# Patient Record
Sex: FEMALE | Race: BLACK OR AFRICAN AMERICAN | NOT HISPANIC OR LATINO | Employment: OTHER | URBAN - METROPOLITAN AREA
[De-identification: names, ages, dates, MRNs, and addresses within clinical notes are randomized per-mention and may not be internally consistent; named-entity substitution may affect disease eponyms.]

---

## 2022-06-22 PROBLEM — I10 ESSENTIAL HYPERTENSION: Status: ACTIVE | Noted: 2022-06-22

## 2022-06-22 PROBLEM — E11.9 DIABETES MELLITUS TYPE II, NON INSULIN DEPENDENT: Status: ACTIVE | Noted: 2022-06-22

## 2022-06-22 PROBLEM — N18.4 CHRONIC KIDNEY DISEASE, STAGE IV (SEVERE): Status: ACTIVE | Noted: 2022-06-22

## 2022-06-22 PROBLEM — N25.81 SECONDARY HYPERPARATHYROIDISM OF RENAL ORIGIN: Status: ACTIVE | Noted: 2022-06-22

## 2022-06-22 PROBLEM — R80.1 PERSISTENT PROTEINURIA: Status: ACTIVE | Noted: 2022-06-22

## 2022-06-22 PROBLEM — E78.2 HYPERLIPIDEMIA, MIXED: Status: ACTIVE | Noted: 2022-06-22

## 2022-06-22 PROBLEM — E55.9 VITAMIN D DEFICIENCY: Status: ACTIVE | Noted: 2022-06-22

## 2022-06-22 RX ORDER — FLUOXETINE 10 MG/1
1 CAPSULE ORAL DAILY
COMMUNITY
Start: 2021-11-23

## 2022-06-22 RX ORDER — OMEPRAZOLE 20 MG/1
1 CAPSULE, DELAYED RELEASE ORAL DAILY
COMMUNITY
Start: 2022-05-02 | End: 2023-06-30

## 2022-06-22 RX ORDER — ATORVASTATIN CALCIUM 80 MG/1
80 TABLET, FILM COATED ORAL NIGHTLY
COMMUNITY
Start: 2022-01-05

## 2022-06-22 RX ORDER — ACETAMINOPHEN 500 MG
1000 TABLET ORAL DAILY
COMMUNITY

## 2022-06-22 RX ORDER — ASPIRIN 325 MG
325 TABLET ORAL DAILY
COMMUNITY
Start: 2022-01-19

## 2022-06-22 RX ORDER — GABAPENTIN 100 MG/1
100 CAPSULE ORAL NIGHTLY
COMMUNITY
Start: 2022-04-07

## 2022-06-22 RX ORDER — LISINOPRIL AND HYDROCHLOROTHIAZIDE 12.5; 2 MG/1; MG/1
1 TABLET ORAL DAILY
COMMUNITY
Start: 2022-03-17

## 2022-06-22 RX ORDER — METOPROLOL TARTRATE 25 MG/1
25 TABLET, FILM COATED ORAL 2 TIMES DAILY
COMMUNITY
Start: 2021-08-17 | End: 2023-10-27

## 2022-06-22 RX ORDER — LEVOTHYROXINE SODIUM 100 UG/1
100 TABLET ORAL DAILY
COMMUNITY
Start: 2022-01-27

## 2022-06-22 NOTE — PROGRESS NOTES
Pt Name:  Estefania Bravo  Pt :  1950  Pt MRN:  [id number]    Date: 2022    Reason for visit:   Estefania Bravo is a 72 y.o. aa female presenting for CKD follow up with serum creatinine 2.32, eGFR 23 and Chronic Kidney Disease Stage 4.     Chief Complaint:   The patient denies any complaints today.    HPI:  The patient is being seen today for follow up CKD and the status of her kidney function. Since the last visit, patient reports she has been seen by Ortho and has right knee replacement pending.  The patient denies fatigue, weakness, poor appetite, metallic taste, nausea, cramping, chest pain, palpitations, SOB, orthopnea, PND, trouble concentrating, decreased urination. She c/o lower extremity edema.      Home BPs when checked are <130/80 per patient. The patient is following a low sodium diet. The patient is avoiding NSAIDs. The patient is drinking 32 oz of water daily.     Since last visit on 3/17/22 patient reports above noted changes in medical history.      History:   Past Medical History:   Diagnosis Date    Disorder of kidney and ureter     Diverticulosis     DM (diabetes mellitus)     GERD (gastroesophageal reflux disease)     Graves disease     Hiatal hernia     HTN (hypertension)     Hypothyroidism, postablative     Osteoporosis     Personal history of colonic polyps     Schatzki's ring     Stroke      Past Surgical History:   Procedure Laterality Date    CARDIAC CATHETERIZATION      CHOLECYSTECTOMY      COLONOSCOPY      THYROID ABLATION      TUBAL LIGATION      UPPER GASTROINTESTINAL ENDOSCOPY       Family History   Problem Relation Age of Onset    Heart disease Mother     Heart disease Father     Cancer Sister     Kidney failure Sister      Social History     Substance and Sexual Activity   Alcohol Use Not Currently     Social History     Substance and Sexual Activity   Drug Use Never     Social History     Substance and Sexual Activity   Sexual Activity Not  Currently     reports previously being sexually active.  Social History     Tobacco Use   Smoking Status Former Smoker   Smokeless Tobacco Never Used       Allergies:  Review of patient's allergies indicates:   Allergen Reactions    Denosumab Swelling         Current Outpatient Medications:     aspirin 325 MG tablet, Take 325 mg by mouth once daily., Disp: , Rfl:     atorvastatin (LIPITOR) 80 MG tablet, Take 80 mg by mouth nightly., Disp: , Rfl:     canagliflozin (INVOKANA) 100 mg Tab tablet, Take 100 mg by mouth once daily., Disp: , Rfl:     cholecalciferol, vitamin D3, (VITAMIN D3) 50 mcg (2,000 unit) Cap capsule, Take 1,000 Units by mouth once daily., Disp: , Rfl:     cyclobenzaprine (FLEXERIL) 10 MG tablet, Take 10 mg by mouth 3 (three) times daily as needed., Disp: , Rfl:     FLUoxetine 10 MG capsule, Take 1 capsule by mouth once daily., Disp: , Rfl:     gabapentin (NEURONTIN) 100 MG capsule, Take 100 mg by mouth nightly., Disp: , Rfl:     levothyroxine (SYNTHROID) 112 MCG tablet, Take 112 mcg by mouth once daily at 6am., Disp: , Rfl:     lisinopriL-hydrochlorothiazide (PRINZIDE,ZESTORETIC) 20-12.5 mg per tablet, Take 1 tablet by mouth once daily., Disp: , Rfl:     metoprolol tartrate (LOPRESSOR) 25 MG tablet, Take 25 mg by mouth 2 (two) times daily., Disp: , Rfl:     omeprazole (PRILOSEC) 20 MG capsule, Take 1 capsule by mouth once daily., Disp: , Rfl:     calcitRIOL (ROCALTROL) 0.25 MCG Cap, Take once capsule on Monday - Wednesday - Friday, Disp: 30 capsule, Rfl: 11    ROS:  Review of Systems   Constitutional: Negative for activity change and appetite change.   HENT: Negative for hearing loss.    Eyes: Negative for visual disturbance.   Respiratory: Negative for shortness of breath and wheezing.    Cardiovascular: Positive for leg swelling. Negative for chest pain.   Gastrointestinal: Negative for abdominal pain, diarrhea, nausea and vomiting.   Genitourinary: Negative for decreased urine volume,  dysuria, flank pain, frequency and urgency.   Neurological: Negative for dizziness, weakness and headaches.       Physical Exam:   Vitals:   Vitals:    06/23/22 0942   BP: 128/76   Pulse: 91   Temp: 97.3 °F (36.3 °C)   SpO2: 97%   Weight: 108.2 kg (238 lb 9.6 oz)   Height: 5' (1.524 m)   PainSc: 0-No pain     Body mass index is 46.6 kg/m².    Physical Exam  Vitals reviewed.   Constitutional:       General: She is not in acute distress.     Appearance: Normal appearance.   HENT:      Head: Normocephalic and atraumatic.      Mouth/Throat:      Mouth: Mucous membranes are moist.   Eyes:      Extraocular Movements: Extraocular movements intact.      Pupils: Pupils are equal, round, and reactive to light.   Cardiovascular:      Rate and Rhythm: Normal rate and regular rhythm.      Heart sounds: No murmur heard.     Comments: Trace bilateral pedal edema   Pulmonary:      Effort: Pulmonary effort is normal.      Breath sounds: No wheezing, rhonchi or rales.   Musculoskeletal:         General: No swelling.      Right lower leg: Edema present.      Left lower leg: Edema present.   Skin:     General: Skin is warm and dry.   Neurological:      Mental Status: She is alert and oriented to person, place, and time.   Psychiatric:         Mood and Affect: Mood normal.         Behavior: Behavior normal.           Labs/Tests:  Lab Results   Component Value Date     06/17/2022    K 4.2 06/17/2022     06/17/2022    CO2 27 06/17/2022    BUN 40 (H) 06/17/2022    CREATININE 2.32 (H) 06/17/2022    CREATININE 1.86 (H) 03/14/2022    CREATININE 1.97 (H) 12/15/2021    GLUCOSE 107 (H) 06/17/2022    CALCIUM 9.4 06/17/2022    ALBUMIN 4.0 06/17/2022    EGFRNONAA 20 (L) 06/17/2022    EGFRNONAA 26 (L) 03/14/2022    EGFRNONAA 25 (L) 12/15/2021     (H) 06/17/2022    HGB 13.3 06/17/2022    HGBA1C 6.7 (H) 01/18/2022    TRIG 159 (H) 01/18/2022    CHOL 158 01/18/2022    HDL 25 (L) 01/18/2022    LDLCALC 101 (H) 01/18/2022    LABVLDL 32  (H) 01/18/2022       Component Ref Range & Units 5 d ago   (6/17/22) 3 mo ago   (3/14/22) 6 mo ago   (12/15/21) 9 mo ago   (9/16/21) 11 mo ago   (7/12/21) 1 yr ago   (5/28/21) 1 yr ago   (11/4/20)   Protein, Urine mg/dL mg/dL 54  103  233  240   418     Creatinine, Urine mg/dL mg/dL 50.5  73.1  67.2  56.3  81.7  122.8  51.8    Urine Protein/Creatinine Ratio <0.2 mg of protein/mg of creatinine 1.1 High   1.4 High   3.5 High   4.3 High    3.4 High            Diagnosis:  1. Essential hypertension  Renal Function Panel    Renal Function Panel   2. Diabetes mellitus type II, non insulin dependent  Renal Function Panel    Renal Function Panel   3. Persistent proteinuria  Renal Function Panel    Protein/Creatinine Ratio, Urine    Renal Function Panel    Protein/Creatinine Ratio, Urine   4. Hyperlipidemia, mixed  Renal Function Panel    Renal Function Panel   5. Secondary hyperparathyroidism of renal origin  PTH, Intact    Vitamin D    PTH, Intact    Vitamin D   6. Vitamin D deficiency  Vitamin D    Vitamin D   7. Chronic kidney disease, stage IV (severe)  Hemoglobin    Renal Function Panel    Vitamin D    Hemoglobin    Renal Function Panel    Vitamin D         Plan:  1. Essential hypertension - at goal -  Monitor BP, 2 Gram NA diet; Continue Lisinopril / HCTZ 20/12.5 mg po daily; Metoprolol 25 mg po BID    2. Diabetes mellitus type II, non insulin dependent - Controlled - Last HgbA1c 6.7% 1/18/22 - Continue Invokana    3. Persistent proteinuria - 1100 mg - down from 1400 mg - ACE   4. Hyperlipidemia, mixed - Continue Lipitor 80 mg po nightly    5. Secondary hyperparathyroidism of renal origin -  - Start Rocaltrol 25 mcg po M-W-F   6. Vitamin D deficiency - Continue Vitamin D 3 1000 units    7. Chronic kidney disease, stage IV (severe) - serum creatinine 2.32 / eGFR 23; (last ov 1.86/30)  Avoid NSAIDS, Assure 64 oz of water daily; Meds per # 1, 2, 4, 5, 6 Have reiterated the importance of 64 oz of water daily.           My reconciliation of medication should not condone or support use of medications that have been previously prescribed for patients by other providers.  I am only documenting the current medications patients is taking and may change doses, add or discontinue medications based on information provided by the patient.     The patient has been provided with their current level of kidney function including eGFR and creatinine.    We discussed the potential for common complications of CKD including anemia, electrolyte abnormalities, abnormal fluid balance, mineral bone disease and malnutrition.    We discussed strategies to slow the progression of their kidney disease includin. Avoid nephrotoxic agents. Avoid over-the-counter and prescription NSAIDs (Ibuprofren, Motrin, Naproxyn, Aleve, Mobic, Celebrex, Toradol, Advil). All of these can worsen kidney function, elevate BP, cause fluid retention/swelling and elevate potassium. Avoid iodine contrast agents and gadolinium, which can worsen kidney function and/or cause kidney failure. Avoid phosphosoda bowel preps which can worsen kidney function.  2. Work to improve modifiable risk factors. Aim for good control of blood glucose without episodes of hypoglycemia. Notify the provider managing your diabetes if your blood glucose < 60. Aim for good blood pressure control without episodes of hypotension. Call the office if your systolic blood pressure is consistently < 110. Aim for good control of your cholesterol.  ? AIC goal <7.0  ? BP goal <130/80        Keeping these in goal range will help prevent progression of cardiovascular disease            and chronic kidney disease.    We discussed dietary modifications:  ? Low sodium diet: 2 gm/d or less  ? Limit/avoid high potassium foods  ? Avoid potassium containing salt substitutes  ? Limit/avoid high phosphorus foods  ? Limit daily protein intake to 0.8-1 gm/kg of your ideal body weight.    We discussed lifestyle  modifications:  ? Make sure you are drinking plenty of fluids--64 ounces (1/2 gallon) daily  ? Exercise at least 30 minutes 5 x per week (total 150 minutes per week), example brisk walking  ? Achieve and maintain a healthy weight (BMI 20-25)  ? Limit alcohol consumption to <2 drinks per day  ? Stop smoking  ? Make sure you stay current on important vaccines-- pneumonia vaccines (Pneumovax and Prevnar), flu vaccine, Hepatitis B (especially patients nearing renal replacement therapy and planning hemodialysis) and Covid-19 vaccine.     Recommendations:  1. Monitor your BP at home daily and record.  2. Bring readings to your next appt.  3. Call the office if your BP is persistently >130/80.  4. Seek urgent medical attention with signs and symptoms of uremia - extreme weakness, fatigue, confusion, anorexia, metallic taste in mouth, hiccoughs, cramping, itching, chest pain, swelling, or trouble sleeping.    Follow Up:   Follow up in about 10 weeks (around 9/1/2022).

## 2022-06-23 ENCOUNTER — OFFICE VISIT (OUTPATIENT)
Dept: NEPHROLOGY | Facility: CLINIC | Age: 72
End: 2022-06-23
Payer: MEDICARE

## 2022-06-23 VITALS
SYSTOLIC BLOOD PRESSURE: 128 MMHG | DIASTOLIC BLOOD PRESSURE: 76 MMHG | HEART RATE: 91 BPM | BODY MASS INDEX: 46.85 KG/M2 | TEMPERATURE: 97 F | OXYGEN SATURATION: 97 % | HEIGHT: 60 IN | WEIGHT: 238.63 LBS

## 2022-06-23 DIAGNOSIS — N25.81 SECONDARY HYPERPARATHYROIDISM OF RENAL ORIGIN: ICD-10-CM

## 2022-06-23 DIAGNOSIS — E11.9 DIABETES MELLITUS TYPE II, NON INSULIN DEPENDENT: ICD-10-CM

## 2022-06-23 DIAGNOSIS — N18.4 CHRONIC KIDNEY DISEASE, STAGE IV (SEVERE): ICD-10-CM

## 2022-06-23 DIAGNOSIS — R80.1 PERSISTENT PROTEINURIA: ICD-10-CM

## 2022-06-23 DIAGNOSIS — I10 ESSENTIAL HYPERTENSION: Primary | ICD-10-CM

## 2022-06-23 DIAGNOSIS — E78.2 HYPERLIPIDEMIA, MIXED: ICD-10-CM

## 2022-06-23 DIAGNOSIS — E55.9 VITAMIN D DEFICIENCY: ICD-10-CM

## 2022-06-23 PROCEDURE — 1126F AMNT PAIN NOTED NONE PRSNT: CPT | Mod: CPTII,,, | Performed by: NURSE PRACTITIONER

## 2022-06-23 PROCEDURE — 1126F PR PAIN SEVERITY QUANTIFIED, NO PAIN PRESENT: ICD-10-PCS | Mod: CPTII,,, | Performed by: NURSE PRACTITIONER

## 2022-06-23 PROCEDURE — 3066F PR DOCUMENTATION OF TREATMENT FOR NEPHROPATHY: ICD-10-PCS | Mod: CPTII,,, | Performed by: NURSE PRACTITIONER

## 2022-06-23 PROCEDURE — 4010F ACE/ARB THERAPY RXD/TAKEN: CPT | Mod: CPTII,,, | Performed by: NURSE PRACTITIONER

## 2022-06-23 PROCEDURE — 4010F PR ACE/ARB THEARPY RXD/TAKEN: ICD-10-PCS | Mod: CPTII,,, | Performed by: NURSE PRACTITIONER

## 2022-06-23 PROCEDURE — 1101F PR PT FALLS ASSESS DOC 0-1 FALLS W/OUT INJ PAST YR: ICD-10-PCS | Mod: CPTII,,, | Performed by: NURSE PRACTITIONER

## 2022-06-23 PROCEDURE — 1159F PR MEDICATION LIST DOCUMENTED IN MEDICAL RECORD: ICD-10-PCS | Mod: CPTII,,, | Performed by: NURSE PRACTITIONER

## 2022-06-23 PROCEDURE — 99214 OFFICE O/P EST MOD 30 MIN: CPT | Mod: ,,, | Performed by: NURSE PRACTITIONER

## 2022-06-23 PROCEDURE — 99214 PR OFFICE/OUTPT VISIT, EST, LEVL IV, 30-39 MIN: ICD-10-PCS | Mod: ,,, | Performed by: NURSE PRACTITIONER

## 2022-06-23 PROCEDURE — 3066F NEPHROPATHY DOC TX: CPT | Mod: CPTII,,, | Performed by: NURSE PRACTITIONER

## 2022-06-23 PROCEDURE — 1160F PR REVIEW ALL MEDS BY PRESCRIBER/CLIN PHARMACIST DOCUMENTED: ICD-10-PCS | Mod: CPTII,,, | Performed by: NURSE PRACTITIONER

## 2022-06-23 PROCEDURE — 1159F MED LIST DOCD IN RCRD: CPT | Mod: CPTII,,, | Performed by: NURSE PRACTITIONER

## 2022-06-23 PROCEDURE — 3008F BODY MASS INDEX DOCD: CPT | Mod: CPTII,,, | Performed by: NURSE PRACTITIONER

## 2022-06-23 PROCEDURE — 3078F PR MOST RECENT DIASTOLIC BLOOD PRESSURE < 80 MM HG: ICD-10-PCS | Mod: CPTII,,, | Performed by: NURSE PRACTITIONER

## 2022-06-23 PROCEDURE — 3288F PR FALLS RISK ASSESSMENT DOCUMENTED: ICD-10-PCS | Mod: CPTII,,, | Performed by: NURSE PRACTITIONER

## 2022-06-23 PROCEDURE — 1101F PT FALLS ASSESS-DOCD LE1/YR: CPT | Mod: CPTII,,, | Performed by: NURSE PRACTITIONER

## 2022-06-23 PROCEDURE — 3288F FALL RISK ASSESSMENT DOCD: CPT | Mod: CPTII,,, | Performed by: NURSE PRACTITIONER

## 2022-06-23 PROCEDURE — 3008F PR BODY MASS INDEX (BMI) DOCUMENTED: ICD-10-PCS | Mod: CPTII,,, | Performed by: NURSE PRACTITIONER

## 2022-06-23 PROCEDURE — 1160F RVW MEDS BY RX/DR IN RCRD: CPT | Mod: CPTII,,, | Performed by: NURSE PRACTITIONER

## 2022-06-23 PROCEDURE — 3078F DIAST BP <80 MM HG: CPT | Mod: CPTII,,, | Performed by: NURSE PRACTITIONER

## 2022-06-23 PROCEDURE — 3074F PR MOST RECENT SYSTOLIC BLOOD PRESSURE < 130 MM HG: ICD-10-PCS | Mod: CPTII,,, | Performed by: NURSE PRACTITIONER

## 2022-06-23 PROCEDURE — 3074F SYST BP LT 130 MM HG: CPT | Mod: CPTII,,, | Performed by: NURSE PRACTITIONER

## 2022-06-23 RX ORDER — CALCITRIOL 0.25 UG/1
CAPSULE ORAL
Qty: 30 CAPSULE | Refills: 11 | Status: SHIPPED | OUTPATIENT
Start: 2022-06-23 | End: 2023-05-24 | Stop reason: SDUPTHER

## 2022-06-23 RX ORDER — CYCLOBENZAPRINE HCL 10 MG
10 TABLET ORAL
COMMUNITY
Start: 2022-01-26 | End: 2023-03-24

## 2022-08-31 PROBLEM — R80.9 NON-NEPHROTIC RANGE PROTEINURIA: Status: ACTIVE | Noted: 2022-08-31

## 2022-08-31 NOTE — ASSESSMENT & PLAN NOTE
At goal - 2 Gram NA diet, Monitor BP; Continue Lisinopril / HCTZ 20/12.5 mg po daily; Metoprolol 25 mg po BID

## 2022-08-31 NOTE — ASSESSMENT & PLAN NOTE
Serum creatinine 2.41 / eGFR 22 - without anemia - Avoid NSAIDS, Assure 64 oz of water daily, Meds per Med list above.  Refer to CKD Smart class. Schedule Vein Mapping and Surgical referral for AV access.     NOTE:  Her eGFR dropped to 17 when she had Covid and has come back up to 22.

## 2022-08-31 NOTE — PROGRESS NOTES
Pt Name:  Estefania Bravo  Pt :  1950  Pt MRN:  [id number]    Date: 2022    Reason for visit:   Estefania Bravo is a 72 y.o. aa female presenting for CKD follow up with serum creatinine 2.41, eGFR 22 and Chronic Kidney Disease Stage IV.     Chief Complaint:   The patient denies any complaints today.    HPI:  The patient is being seen today for follow up CKD and the status of her kidney function. Since the last visit, patient reports/medical record review reveals she was seen at [facility] ER on 7/10/22 with c/o abdominal pain.  She was treated with Zofran and GI cocktail and d/c to home with prescription for Carafate.  She had Covid in 2022.  Dr. Lam changed her from Invokana to [facility] on 22. The patient denies fatigue, weakness, poor appetite, metallic taste, nausea, cramping, chest pain, palpitations, orthopnea, PND, edema, trouble concentrating, decreased urination.  She c/o SOB with exertion.     Home BPs when checked are <130/80 per patient. The patient is following a low sodium diet. The patient is avoiding NSAIDs. The patient is drinking 50 - 64 oz of water daily.     Since last visit on 22 patient reports above noted changes in medical history.      History:   Past Medical History:   Diagnosis Date    Disorder of kidney and ureter     Diverticulosis     DM (diabetes mellitus)     GERD (gastroesophageal reflux disease)     Graves disease     Hiatal hernia     HTN (hypertension)     Hypothyroidism, postablative     Osteoporosis     Personal history of colonic polyps     Schatzki's ring     Stroke      Past Surgical History:   Procedure Laterality Date    CARDIAC CATHETERIZATION      CHOLECYSTECTOMY      COLONOSCOPY      THYROID ABLATION      TUBAL LIGATION      UPPER GASTROINTESTINAL ENDOSCOPY       Family History   Problem Relation Age of Onset    Heart disease Mother     Heart disease Father     Cancer Sister     Kidney failure Sister      Social History     Substance and  Sexual Activity   Alcohol Use Not Currently     Social History     Substance and Sexual Activity   Drug Use Never     Social History     Substance and Sexual Activity   Sexual Activity Not Currently     reports that she is not currently sexually active.  Social History     Tobacco Use   Smoking Status Former   Smokeless Tobacco Never       Allergies:  Review of patient's allergies indicates:   Allergen Reactions    Denosumab Swelling         Current Outpatient Medications:     aspirin 325 MG tablet, Take 325 mg by mouth once daily., Disp: , Rfl:     atorvastatin (LIPITOR) 80 MG tablet, Take 80 mg by mouth nightly., Disp: , Rfl:     calcitRIOL (ROCALTROL) 0.25 MCG Cap, Take once capsule on Monday - Wednesday - Friday, Disp: 30 capsule, Rfl: 11    cholecalciferol, vitamin D3, (VITAMIN D3) 50 mcg (2,000 unit) Cap capsule, Take 1,000 Units by mouth once daily., Disp: , Rfl:     cyclobenzaprine (FLEXERIL) 10 MG tablet, Take 10 mg by mouth daily as needed., Disp: , Rfl:     dapagliflozin (FARXIGA) 10 mg tablet, Take 10 mg by mouth once daily., Disp: , Rfl:     FLUoxetine 10 MG capsule, Take 1 capsule by mouth once daily., Disp: , Rfl:     gabapentin (NEURONTIN) 100 MG capsule, Take 100 mg by mouth nightly., Disp: , Rfl:     levothyroxine (SYNTHROID) 112 MCG tablet, Take 112 mcg by mouth once daily at 6am., Disp: , Rfl:     lisinopriL-hydrochlorothiazide (PRINZIDE,ZESTORETIC) 20-12.5 mg per tablet, Take 1 tablet by mouth once daily., Disp: , Rfl:     omeprazole (PRILOSEC) 20 MG capsule, Take 1 capsule by mouth once daily., Disp: , Rfl:     pioglitazone (ACTOS) 30 MG tablet, Take 30 mg by mouth once daily., Disp: , Rfl:     metoprolol tartrate (LOPRESSOR) 25 MG tablet, Take 25 mg by mouth 2 (two) times daily., Disp: , Rfl:     ROS:  Review of Systems   Constitutional:  Negative for activity change and appetite change.   HENT:  Negative for hearing loss.    Eyes:  Negative for visual disturbance.   Respiratory:  Positive for  shortness of breath. Negative for wheezing.    Cardiovascular:  Negative for chest pain.   Gastrointestinal:  Negative for abdominal pain, diarrhea, nausea and vomiting.   Genitourinary:  Negative for decreased urine volume, dysuria, flank pain, frequency and urgency.   Neurological:  Negative for dizziness, weakness and headaches.     Physical Exam:   Vitals:   Vitals:    09/01/22 1022   BP: 133/73   Pulse: 85   SpO2: 96%   Weight: 106.9 kg (235 lb 9.6 oz)   Height: 5' (1.524 m)   PainSc: 0-No pain     Body mass index is 46.01 kg/m².    Physical Exam  Vitals reviewed.   Constitutional:       General: She is not in acute distress.     Appearance: Normal appearance. She is obese.   HENT:      Head: Normocephalic and atraumatic.      Mouth/Throat:      Mouth: Mucous membranes are moist.   Eyes:      Extraocular Movements: Extraocular movements intact.      Pupils: Pupils are equal, round, and reactive to light.   Cardiovascular:      Rate and Rhythm: Normal rate and regular rhythm.      Heart sounds: No murmur heard.     Comments: Trace bilateral pedal edema   Pulmonary:      Effort: Pulmonary effort is normal.      Breath sounds: No wheezing, rhonchi or rales.   Musculoskeletal:         General: No swelling.   Skin:     General: Skin is warm and dry.   Neurological:      Mental Status: She is alert and oriented to person, place, and time.   Psychiatric:         Mood and Affect: Mood normal.         Behavior: Behavior normal.       Labs/Tests:  Lab Results   Component Value Date     08/29/2022    K 4.2 08/29/2022     (H) 08/29/2022    CO2 25 08/29/2022    BUN 42 (H) 08/29/2022    CREATININE 2.41 (H) 08/29/2022    CREATININE 3.00 (H) 07/19/2022    CREATININE 2.31 (H) 07/10/2022    GLUCOSE 87 08/29/2022    CALCIUM 9.8 08/29/2022    PHOSPHORUS 4.0 08/29/2022    ALBUMIN 3.9 08/29/2022    EGFRNONAA 19 (L) 08/29/2022    EGFRNONAA 15 (L) 07/19/2022    EGFRNONAA 20 (L) 07/10/2022    ESTGFRAFRICA 22 (L) 08/29/2022     ESTGFRAFRICA 17 (L) 07/19/2022    ESTGFRAFRICA 23 (L) 07/10/2022     (H) 08/29/2022    HGB 12.8 08/29/2022    HGBA1C 6.9 (H) 07/19/2022    TRIG 181 (H) 07/19/2022    CHOL 134 07/19/2022    HDL 23 (L) 07/19/2022    LDLCALC 74 07/19/2022    LABVLDL 36 (H) 07/19/2022    TZCKXQNZ26RS 55.7 08/29/2022     Component Ref Range & Units 2 d ago   (8/29/22) 2 mo ago   (6/17/22) 5 mo ago   (3/14/22) 8 mo ago   (12/15/21) 11 mo ago   (9/16/21) 1 yr ago   (7/12/21) 1 yr ago   (5/28/21)   Protein, Urine mg/dL mg/dL 65  54  103  233  240   418    Creatinine, Urine mg/dL mg/dL 53.1  50.5  73.1  67.2  56.3  81.7  122.8    Urine Protein/Creatinine Ratio <0.2 mg of protein/mg of creatinine 1.2 High   1.1 High   1.4 High   3.5 High   4.3 High             Diagnosis:  1. Diabetes mellitus type II, non insulin dependent  Renal Function Panel    Renal Function Panel      2. Essential hypertension  Renal Function Panel    Renal Function Panel      3. Chronic kidney disease, stage IV (severe)  Hemoglobin    Renal Function Panel    PTH, Intact    Protein/Creatinine Ratio, Urine    Ambulatory referral/consult to Kidney Smart    Ambulatory referral/consult to Vascular Surgery    US Vein Mapping, Hemodialysis, Bilateral    Hemoglobin    Renal Function Panel    PTH, Intact    Protein/Creatinine Ratio, Urine    US Vein Mapping, Hemodialysis, Bilateral      4. Secondary hyperparathyroidism of renal origin  PTH, Intact    PTH, Intact      5. Non-nephrotic range proteinuria  Protein/Creatinine Ratio, Urine    Protein/Creatinine Ratio, Urine      6. Hyperlipidemia, mixed             Plan:  Diabetes mellitus type II, non insulin dependent  Controlled with HgbA1c 6.9% on 7/19/22 - Continue Actos 30 mg po daily, Farxiga 10 mg po daily     Essential hypertension  At goal - 2 Gram NA diet, Monitor BP; Continue Lisinopril / HCTZ 20/12.5 mg po daily; Metoprolol 25 mg po BID     Chronic kidney disease, stage IV (severe)  Serum creatinine 2.41 / eGFR  22 - without anemia - Avoid NSAIDS, Assure 64 oz of water daily, Meds per Med list above.  Refer to CKD Smart class. Schedule Vein Mapping and Surgical referral for AV access.     NOTE:  Her eGFR dropped to 17 when she had Covid and has come back up to 22.      Secondary hyperparathyroidism of renal origin   - Continue Rocaltrol 25 mcg po M-W-F     Non-nephrotic range proteinuria  1200 mg - up from 1100 mg -  ACE / SGLT2     Hyperlipidemia, mixed  Continue Lipitor 80 mg po nightly        My reconciliation of medication should not condone or support use of medications that have been previously prescribed for patients by other providers.  I am only documenting the current medications patients is taking and may change doses, add or discontinue medications based on information provided by the patient.     The patient has been provided with their current level of kidney function including eGFR and creatinine.    We discussed the potential for common complications of CKD including anemia, electrolyte abnormalities, abnormal fluid balance, mineral bone disease and malnutrition.    We discussed strategies to slow the progression of their kidney disease including:  Avoid nephrotoxic agents. Avoid over-the-counter and prescription NSAIDs (Ibuprofren, Motrin, Naproxyn, Aleve, Mobic, Celebrex, Toradol, Advil). All of these can worsen kidney function, elevate BP, cause fluid retention/swelling and elevate potassium. Avoid iodine contrast agents and gadolinium, which can worsen kidney function and/or cause kidney failure. Avoid phosphosoda bowel preps which can worsen kidney function.  Work to improve modifiable risk factors. Aim for good control of blood glucose without episodes of hypoglycemia. Notify the provider managing your diabetes if your blood glucose < 60. Aim for good blood pressure control without episodes of hypotension. Call the office if your systolic blood pressure is consistently < 110. Aim for good  control of your cholesterol.  AIC goal <7.0  BP goal <130/80        Keeping these in goal range will help prevent progression of cardiovascular disease            and chronic kidney disease.    We discussed dietary modifications:  Low sodium diet: 2 gm/d or less  Limit/avoid high potassium foods  Avoid potassium containing salt substitutes  Limit/avoid high phosphorus foods  Limit daily protein intake to 0.8-1 gm/kg of your ideal body weight.    We discussed lifestyle modifications:  Make sure you are drinking plenty of fluids--64 ounces (1/2 gallon) daily  Exercise at least 30 minutes 5 x per week (total 150 minutes per week), example brisk walking  Achieve and maintain a healthy weight (BMI 20-25)  Limit alcohol consumption to <2 drinks per day  Stop smoking  Make sure you stay current on important vaccines-- pneumonia vaccines (Pneumovax and Prevnar), flu vaccine, Hepatitis B (especially patients nearing renal replacement therapy and planning hemodialysis) and Covid-19 vaccine.     Recommendations:  Monitor your BP at home daily and record.  Bring readings to your next appt.  Call the office if your BP is persistently >130/80.  Seek urgent medical attention with signs and symptoms of uremia - extreme weakness, fatigue, confusion, anorexia, metallic taste in mouth, hiccoughs, cramping, itching, chest pain, swelling, or trouble sleeping.    Follow Up:   Follow up in about 3 months (around 12/1/2022).

## 2022-09-01 ENCOUNTER — TELEPHONE (OUTPATIENT)
Dept: NEPHROLOGY | Facility: CLINIC | Age: 72
End: 2022-09-01

## 2022-09-01 ENCOUNTER — OFFICE VISIT (OUTPATIENT)
Dept: NEPHROLOGY | Facility: CLINIC | Age: 72
End: 2022-09-01
Payer: MEDICARE

## 2022-09-01 VITALS
OXYGEN SATURATION: 96 % | DIASTOLIC BLOOD PRESSURE: 73 MMHG | SYSTOLIC BLOOD PRESSURE: 133 MMHG | HEART RATE: 85 BPM | BODY MASS INDEX: 46.26 KG/M2 | WEIGHT: 235.63 LBS | HEIGHT: 60 IN

## 2022-09-01 DIAGNOSIS — N25.81 SECONDARY HYPERPARATHYROIDISM OF RENAL ORIGIN: ICD-10-CM

## 2022-09-01 DIAGNOSIS — I10 ESSENTIAL HYPERTENSION: ICD-10-CM

## 2022-09-01 DIAGNOSIS — E78.2 HYPERLIPIDEMIA, MIXED: ICD-10-CM

## 2022-09-01 DIAGNOSIS — E11.9 DIABETES MELLITUS TYPE II, NON INSULIN DEPENDENT: Primary | ICD-10-CM

## 2022-09-01 DIAGNOSIS — N18.4 CHRONIC KIDNEY DISEASE, STAGE IV (SEVERE): ICD-10-CM

## 2022-09-01 DIAGNOSIS — R80.9 NON-NEPHROTIC RANGE PROTEINURIA: ICD-10-CM

## 2022-09-01 PROCEDURE — 1160F PR REVIEW ALL MEDS BY PRESCRIBER/CLIN PHARMACIST DOCUMENTED: ICD-10-PCS | Mod: CPTII,,, | Performed by: NURSE PRACTITIONER

## 2022-09-01 PROCEDURE — 3008F PR BODY MASS INDEX (BMI) DOCUMENTED: ICD-10-PCS | Mod: CPTII,,, | Performed by: NURSE PRACTITIONER

## 2022-09-01 PROCEDURE — 1126F PR PAIN SEVERITY QUANTIFIED, NO PAIN PRESENT: ICD-10-PCS | Mod: CPTII,,, | Performed by: NURSE PRACTITIONER

## 2022-09-01 PROCEDURE — 3066F PR DOCUMENTATION OF TREATMENT FOR NEPHROPATHY: ICD-10-PCS | Mod: CPTII,,, | Performed by: NURSE PRACTITIONER

## 2022-09-01 PROCEDURE — 3075F PR MOST RECENT SYSTOLIC BLOOD PRESS GE 130-139MM HG: ICD-10-PCS | Mod: CPTII,,, | Performed by: NURSE PRACTITIONER

## 2022-09-01 PROCEDURE — 1159F PR MEDICATION LIST DOCUMENTED IN MEDICAL RECORD: ICD-10-PCS | Mod: CPTII,,, | Performed by: NURSE PRACTITIONER

## 2022-09-01 PROCEDURE — 3078F PR MOST RECENT DIASTOLIC BLOOD PRESSURE < 80 MM HG: ICD-10-PCS | Mod: CPTII,,, | Performed by: NURSE PRACTITIONER

## 2022-09-01 PROCEDURE — 1126F AMNT PAIN NOTED NONE PRSNT: CPT | Mod: CPTII,,, | Performed by: NURSE PRACTITIONER

## 2022-09-01 PROCEDURE — 99214 OFFICE O/P EST MOD 30 MIN: CPT | Mod: ,,, | Performed by: NURSE PRACTITIONER

## 2022-09-01 PROCEDURE — 3288F FALL RISK ASSESSMENT DOCD: CPT | Mod: CPTII,,, | Performed by: NURSE PRACTITIONER

## 2022-09-01 PROCEDURE — 3008F BODY MASS INDEX DOCD: CPT | Mod: CPTII,,, | Performed by: NURSE PRACTITIONER

## 2022-09-01 PROCEDURE — 3288F PR FALLS RISK ASSESSMENT DOCUMENTED: ICD-10-PCS | Mod: CPTII,,, | Performed by: NURSE PRACTITIONER

## 2022-09-01 PROCEDURE — 4010F ACE/ARB THERAPY RXD/TAKEN: CPT | Mod: CPTII,,, | Performed by: NURSE PRACTITIONER

## 2022-09-01 PROCEDURE — 1160F RVW MEDS BY RX/DR IN RCRD: CPT | Mod: CPTII,,, | Performed by: NURSE PRACTITIONER

## 2022-09-01 PROCEDURE — 1101F PT FALLS ASSESS-DOCD LE1/YR: CPT | Mod: CPTII,,, | Performed by: NURSE PRACTITIONER

## 2022-09-01 PROCEDURE — 99214 PR OFFICE/OUTPT VISIT, EST, LEVL IV, 30-39 MIN: ICD-10-PCS | Mod: ,,, | Performed by: NURSE PRACTITIONER

## 2022-09-01 PROCEDURE — 4010F PR ACE/ARB THEARPY RXD/TAKEN: ICD-10-PCS | Mod: CPTII,,, | Performed by: NURSE PRACTITIONER

## 2022-09-01 PROCEDURE — 3066F NEPHROPATHY DOC TX: CPT | Mod: CPTII,,, | Performed by: NURSE PRACTITIONER

## 2022-09-01 PROCEDURE — 3075F SYST BP GE 130 - 139MM HG: CPT | Mod: CPTII,,, | Performed by: NURSE PRACTITIONER

## 2022-09-01 PROCEDURE — 1101F PR PT FALLS ASSESS DOC 0-1 FALLS W/OUT INJ PAST YR: ICD-10-PCS | Mod: CPTII,,, | Performed by: NURSE PRACTITIONER

## 2022-09-01 PROCEDURE — 3078F DIAST BP <80 MM HG: CPT | Mod: CPTII,,, | Performed by: NURSE PRACTITIONER

## 2022-09-01 PROCEDURE — 1159F MED LIST DOCD IN RCRD: CPT | Mod: CPTII,,, | Performed by: NURSE PRACTITIONER

## 2022-09-01 RX ORDER — DAPAGLIFLOZIN 10 MG/1
10 TABLET, FILM COATED ORAL DAILY
COMMUNITY

## 2022-09-01 RX ORDER — PIOGLITAZONEHYDROCHLORIDE 30 MG/1
30 TABLET ORAL DAILY
COMMUNITY
Start: 2022-07-26 | End: 2023-10-27

## 2022-09-01 NOTE — TELEPHONE ENCOUNTER
Spoke to the pt and informed her that the vein mapping ultrasound is scheduled at the [facility] for Fri. 9/16/22 at 10 am. She was also informed that the surgical referral was sent to Dr. Brothers and they would call her to schedule appt. Pt stated understanding.

## 2022-12-08 PROBLEM — N18.4 HYPERTENSION ASSOCIATED WITH STAGE 4 CHRONIC KIDNEY DISEASE DUE TO TYPE 2 DIABETES MELLITUS: Status: ACTIVE | Noted: 2022-06-22

## 2022-12-08 PROBLEM — N18.4 TYPE 2 DIABETES MELLITUS WITH STAGE 4 CHRONIC KIDNEY DISEASE, WITHOUT LONG-TERM CURRENT USE OF INSULIN: Status: ACTIVE | Noted: 2022-06-22

## 2022-12-08 PROBLEM — I12.9 HYPERTENSION ASSOCIATED WITH STAGE 4 CHRONIC KIDNEY DISEASE DUE TO TYPE 2 DIABETES MELLITUS: Status: ACTIVE | Noted: 2022-06-22

## 2022-12-08 PROBLEM — E11.22 TYPE 2 DIABETES MELLITUS WITH STAGE 4 CHRONIC KIDNEY DISEASE, WITHOUT LONG-TERM CURRENT USE OF INSULIN: Status: ACTIVE | Noted: 2022-06-22

## 2022-12-08 PROBLEM — R80.1 PERSISTENT PROTEINURIA: Status: RESOLVED | Noted: 2022-06-22 | Resolved: 2022-12-08

## 2022-12-08 PROBLEM — E11.22 HYPERTENSION ASSOCIATED WITH STAGE 4 CHRONIC KIDNEY DISEASE DUE TO TYPE 2 DIABETES MELLITUS: Status: ACTIVE | Noted: 2022-06-22

## 2022-12-08 NOTE — ASSESSMENT & PLAN NOTE
Serum creatinine 2.89 / eGFR 17 - without anemia - Avoid NSAIDS, Assure 64 oz of water daily, Meds per Med list above.      She underwent Left upper arm AV graft removal and pericardial patching of the brachial artery due to steal syndrome in the OR on 10/20/22 by Dr. Patrick.    She was referred to CKD Smart Class and attended via telephone.

## 2022-12-08 NOTE — PROGRESS NOTES
Pt Name:  Estefania Bravo  Pt :  1950  Pt MRN:  [id number]    Date: 2022    Reason for visit:   Estefania Bravo is a 72 y.o. aa female presenting for CKD follow up with serum creatinine 2.89, eGFR 17 and Chronic Kidney Disease Stage IV.     Chief Complaint:   The patient denies any complaints today.    HPI:  The patient is being seen today for follow up CKD and the status of her kidney function. Since the last visit, patient reports/medical record review reveals patient had a Left upper arm AV graft removal and pericardial patching of the brachial artery due to steal syndrome in the OR on 10/20/22 by Dr. Patrick. She also reports having dental work done on Monday and she has not been drinking enough water.   The patient denies fatigue, weakness, poor appetite, metallic taste, nausea, cramping, chest pain, palpitations, SOB, orthopnea, PND, edema, trouble concentrating, decreased urination.      Home BPs when checked are <130/80 per patient. The patient is following a low sodium diet. The patient is avoiding NSAIDs. The patient is drinking 64 oz of water daily.     Since last visit on 22 patient reports above noted changes in medical history.      History:   Past Medical History:   Diagnosis Date    Disorder of kidney and ureter     Diverticulosis     DM (diabetes mellitus)     GERD (gastroesophageal reflux disease)     Graves disease     Hiatal hernia     HTN (hypertension)     Hypothyroidism, postablative     Osteoporosis     Persistent proteinuria 2022    Personal history of colonic polyps     Schatzki's ring     Stroke      Past Surgical History:   Procedure Laterality Date    CARDIAC CATHETERIZATION      CHOLECYSTECTOMY      COLONOSCOPY      THYROID ABLATION      TUBAL LIGATION      UPPER GASTROINTESTINAL ENDOSCOPY       Family History   Problem Relation Age of Onset    Heart disease Mother     Heart disease Father     Cancer Sister     Kidney failure Sister      Social History      Substance and Sexual Activity   Alcohol Use Not Currently     Social History     Substance and Sexual Activity   Drug Use Yes    Comment: tylenol #3 for a tooth problem     Social History     Substance and Sexual Activity   Sexual Activity Not Currently     reports that she is not currently sexually active.  Social History     Tobacco Use   Smoking Status Former   Smokeless Tobacco Never       Allergies:  Review of patient's allergies indicates:   Allergen Reactions    Denosumab Swelling         Current Outpatient Medications:     acetaminophen-codeine 300-30mg (TYLENOL #3) 300-30 mg Tab, Take 1 tablet by mouth every 8 (eight) hours as needed., Disp: , Rfl:     aspirin 325 MG tablet, Take 325 mg by mouth once daily., Disp: , Rfl:     atorvastatin (LIPITOR) 80 MG tablet, Take 80 mg by mouth every evening., Disp: , Rfl:     calcitRIOL (ROCALTROL) 0.25 MCG Cap, Take once capsule on Monday - Wednesday - Friday (Patient taking differently: Take 0.25 mcg by mouth every other day. Take once capsule on Monday - Wednesday - Friday), Disp: 30 capsule, Rfl: 11    cholecalciferol, vitamin D3, (VITAMIN D3) 50 mcg (2,000 unit) Cap capsule, Take 1,000 Units by mouth once daily., Disp: , Rfl:     cyclobenzaprine (FLEXERIL) 10 MG tablet, Take 10 mg by mouth as needed., Disp: , Rfl:     dapagliflozin (FARXIGA) 10 mg tablet, Take 10 mg by mouth once daily., Disp: , Rfl:     erythromycin (ROMYCIN) ophthalmic ointment, as needed., Disp: , Rfl:     famotidine (PEPCID) 40 MG tablet, Take 40 mg by mouth Daily., Disp: , Rfl:     FLUoxetine 10 MG capsule, Take 1 capsule by mouth once daily., Disp: , Rfl:     gabapentin (NEURONTIN) 100 MG capsule, Take 100 mg by mouth nightly., Disp: , Rfl:     levothyroxine (SYNTHROID) 112 MCG tablet, Take 112 mcg by mouth once daily at 6am. Every morning, Disp: , Rfl:     lisinopriL-hydrochlorothiazide (PRINZIDE,ZESTORETIC) 20-12.5 mg per tablet, Take 1 tablet by mouth once daily., Disp: , Rfl:      metoprolol tartrate (LOPRESSOR) 25 MG tablet, Take 25 mg by mouth 2 (two) times daily., Disp: , Rfl:     omeprazole (PRILOSEC) 20 MG capsule, Take 1 capsule by mouth once daily., Disp: , Rfl:     penicillin v potassium (VEETID) 500 MG tablet, Take 500 mg by mouth every 12 (twelve) hours., Disp: , Rfl:     pioglitazone (ACTOS) 30 MG tablet, Take 30 mg by mouth once daily. am, Disp: , Rfl:     ROS:  Review of Systems   Constitutional:  Negative for activity change and appetite change.   HENT:  Negative for hearing loss.    Eyes:  Negative for visual disturbance.   Respiratory:  Negative for shortness of breath and wheezing.    Cardiovascular:  Negative for chest pain.   Gastrointestinal:  Negative for abdominal pain, diarrhea, nausea and vomiting.   Genitourinary:  Negative for decreased urine volume, dysuria, flank pain, frequency and urgency.   Neurological:  Negative for dizziness, weakness and headaches.     Physical Exam:   Vitals:   Vitals:    12/09/22 1055   BP: (!) 112/51   Pulse: 93   SpO2: 99%   Weight: 104.3 kg (230 lb)   Height: 5' (1.524 m)   PainSc:   4   PainLoc: Knee     Body mass index is 44.92 kg/m².    Physical Exam  Vitals reviewed.   Constitutional:       General: She is not in acute distress.     Appearance: Normal appearance. She is morbidly obese.   HENT:      Head: Normocephalic and atraumatic.      Mouth/Throat:      Mouth: Mucous membranes are moist.   Eyes:      Extraocular Movements: Extraocular movements intact.      Pupils: Pupils are equal, round, and reactive to light.   Cardiovascular:      Rate and Rhythm: Normal rate and regular rhythm.      Heart sounds: No murmur heard.  Pulmonary:      Effort: Pulmonary effort is normal.      Breath sounds: No wheezing, rhonchi or rales.   Musculoskeletal:         General: No swelling.   Skin:     General: Skin is warm and dry.   Neurological:      Mental Status: She is alert and oriented to person, place, and time.   Psychiatric:         Mood  and Affect: Mood normal.         Behavior: Behavior normal.       Labs/Tests:  RENAL FUNCTION PANEL     Ref Range & Units 1 d ago   Sodium 136 - 147 mmol/L 140    Potassium 3.5 - 5.1 mmol/L 4.2    Chloride 98 - 107 mmol/L 103    CO2 20 - 31 mmol/L 27    Glucose 70 - 99 mg/dL 95    BUN 9 - 23 mg/dL 36 High     Creatinine 0.55 - 1.02 mg/dL 2.89 High     Calcium 8.3 - 10.6 mg/dL 9.4    Phosphorus Level 2.4 - 5.1 mg/dL 4.0    Albumin Level 3.2 - 4.8 g/dL 4.1    Anion Gap 5.0 - 15.0 mmol/L 10.3    eGFR CKD-EPI >90 ml/min/1.73m2 17 Low       Component Ref Range & Units 1 d ago   (12/7/22) 1 mo ago   (10/17/22) 3 mo ago   (8/29/22) 5 mo ago   (7/10/22) 5 mo ago   (6/17/22) 8 mo ago   (3/14/22) 11 mo ago   (12/15/21)   Hemoglobin 11.3 - 15.4 g/dL 13.9  13.1  12.8  14.4  13.3  13.0  13.4      PTH, Intact 15 - 65 pg/mL 222 High           Lab Results   Component Value Date    UPROTCREA 0.7 (H) 12/07/2022    UPROTCREA 1.2 (H) 08/29/2022    UPROTCREA 1.1 (H) 06/17/2022         Diagnosis:  Plan and Assessment:  1. Type 2 diabetes mellitus with stage 4 chronic kidney disease, without long-term current use of insulin  Assessment & Plan:  Controlled with HgbA1c 6.9% on 7/19/22 - Continue Actos 30 mg po daily, Farxiga 10 mg po daily     Orders:  -     Renal Function Panel; Future; Expected date: 03/09/2023  -     Protein/Creatinine Ratio, Urine; Future; Expected date: 03/09/2023    2. Hypertension associated with stage 4 chronic kidney disease due to type 2 diabetes mellitus  Assessment & Plan:  At goal - 2 Gram NA diet, Monitor BP; Continue Lisinopril / HCTZ 20/12.5 mg po daily; Metoprolol 25 mg po BID     Orders:  -     Renal Function Panel; Future; Expected date: 03/09/2023    3. Chronic kidney disease, stage IV (severe)  Assessment & Plan:  Serum creatinine 2.89 / eGFR 17 - without anemia - Avoid NSAIDS, Assure 64 oz of water daily, Meds per Med list above.      She underwent Left upper arm AV graft removal and pericardial  patching of the brachial artery due to steal syndrome in the OR on 10/20/22 by Dr. Patrick.    She was referred to CKD Smart Class and attended via telephone.       Orders:  -     Hemoglobin; Future; Expected date: 03/09/2023  -     Vitamin D; Future; Expected date: 03/09/2023  -     Renal Function Panel; Future; Expected date: 03/09/2023  -     PTH, Intact; Future; Expected date: 03/09/2023  -     Protein/Creatinine Ratio, Urine; Future; Expected date: 03/09/2023    4. Secondary hyperparathyroidism of renal origin  Assessment & Plan:   - Continue Rocaltrol 25 mcg po M-W-F     Orders:  -     Vitamin D; Future; Expected date: 03/09/2023  -     PTH, Intact; Future; Expected date: 03/09/2023    5. Non-nephrotic range proteinuria  Assessment & Plan:  700 mg down from 1200 mg  -  ACE / SGLT2     Orders:  -     Protein/Creatinine Ratio, Urine; Future; Expected date: 03/09/2023    6. Hyperlipidemia, mixed  Assessment & Plan:  Continue Lipitor 80 mg po nightly            My reconciliation of medication should not condone or support use of medications that have been previously prescribed for patients by other providers.  I am only documenting the current medications patients is taking and may change doses, add or discontinue medications based on information provided by the patient.     The patient has been provided with their current level of kidney function including eGFR and creatinine.    We discussed the potential for common complications of CKD including anemia, electrolyte abnormalities, abnormal fluid balance, mineral bone disease and malnutrition.    We discussed strategies to slow the progression of their kidney disease including:  Avoid nephrotoxic agents. Avoid over-the-counter and prescription NSAIDs (Ibuprofren, Motrin, Naproxyn, Aleve, Mobic, Celebrex, Toradol, Advil). All of these can worsen kidney function, elevate BP, cause fluid retention/swelling and elevate potassium. Avoid iodine contrast agents and  gadolinium, which can worsen kidney function and/or cause kidney failure. Avoid phosphosoda bowel preps which can worsen kidney function.  Work to improve modifiable risk factors. Aim for good control of blood glucose without episodes of hypoglycemia. Notify the provider managing your diabetes if your blood glucose < 60. Aim for good blood pressure control without episodes of hypotension. Call the office if your systolic blood pressure is consistently < 110. Aim for good control of your cholesterol.  AIC goal <7.0  BP goal <130/80        Keeping these in goal range will help prevent progression of cardiovascular disease            and chronic kidney disease.    We discussed dietary modifications:  Low sodium diet: 2 gm/d or less  Limit/avoid high potassium foods  Avoid potassium containing salt substitutes  Limit/avoid high phosphorus foods  Limit daily protein intake to 0.8-1 gm/kg of your ideal body weight.    We discussed lifestyle modifications:  Make sure you are drinking plenty of fluids--64 ounces (1/2 gallon) daily  Exercise at least 30 minutes 5 x per week (total 150 minutes per week), example brisk walking  Achieve and maintain a healthy weight (BMI 20-25)  Limit alcohol consumption to <2 drinks per day  Stop smoking  Make sure you stay current on important vaccines-- pneumonia vaccines (Pneumovax and Prevnar), flu vaccine, Hepatitis B (especially patients nearing renal replacement therapy and planning hemodialysis) and Covid-19 vaccine.     Recommendations:  Monitor your BP at home daily and record.  Bring readings to your next appt.  Call the office if your BP is persistently >130/80.  Seek urgent medical attention with signs and symptoms of uremia - extreme weakness, fatigue, confusion, anorexia, metallic taste in mouth, hiccoughs, cramping, itching, chest pain, swelling, or trouble sleeping.    Follow Up:   Follow up in about 3 months (around 3/9/2023).

## 2022-12-09 ENCOUNTER — OFFICE VISIT (OUTPATIENT)
Dept: NEPHROLOGY | Facility: CLINIC | Age: 72
End: 2022-12-09
Payer: MEDICARE

## 2022-12-09 VITALS
DIASTOLIC BLOOD PRESSURE: 51 MMHG | BODY MASS INDEX: 45.16 KG/M2 | HEIGHT: 60 IN | OXYGEN SATURATION: 99 % | HEART RATE: 93 BPM | SYSTOLIC BLOOD PRESSURE: 112 MMHG | WEIGHT: 230 LBS

## 2022-12-09 DIAGNOSIS — E78.2 HYPERLIPIDEMIA, MIXED: ICD-10-CM

## 2022-12-09 DIAGNOSIS — N18.4 TYPE 2 DIABETES MELLITUS WITH STAGE 4 CHRONIC KIDNEY DISEASE, WITHOUT LONG-TERM CURRENT USE OF INSULIN: ICD-10-CM

## 2022-12-09 DIAGNOSIS — I12.9 HYPERTENSION ASSOCIATED WITH STAGE 4 CHRONIC KIDNEY DISEASE DUE TO TYPE 2 DIABETES MELLITUS: ICD-10-CM

## 2022-12-09 DIAGNOSIS — N18.4 CHRONIC KIDNEY DISEASE, STAGE IV (SEVERE): ICD-10-CM

## 2022-12-09 DIAGNOSIS — N25.81 SECONDARY HYPERPARATHYROIDISM OF RENAL ORIGIN: ICD-10-CM

## 2022-12-09 DIAGNOSIS — R80.9 NON-NEPHROTIC RANGE PROTEINURIA: ICD-10-CM

## 2022-12-09 DIAGNOSIS — E11.22 TYPE 2 DIABETES MELLITUS WITH STAGE 4 CHRONIC KIDNEY DISEASE, WITHOUT LONG-TERM CURRENT USE OF INSULIN: ICD-10-CM

## 2022-12-09 DIAGNOSIS — E11.22 HYPERTENSION ASSOCIATED WITH STAGE 4 CHRONIC KIDNEY DISEASE DUE TO TYPE 2 DIABETES MELLITUS: ICD-10-CM

## 2022-12-09 DIAGNOSIS — N18.4 HYPERTENSION ASSOCIATED WITH STAGE 4 CHRONIC KIDNEY DISEASE DUE TO TYPE 2 DIABETES MELLITUS: ICD-10-CM

## 2022-12-09 PROCEDURE — 3288F PR FALLS RISK ASSESSMENT DOCUMENTED: ICD-10-PCS | Mod: CPTII,,, | Performed by: NURSE PRACTITIONER

## 2022-12-09 PROCEDURE — 1125F AMNT PAIN NOTED PAIN PRSNT: CPT | Mod: CPTII,,, | Performed by: NURSE PRACTITIONER

## 2022-12-09 PROCEDURE — 1159F MED LIST DOCD IN RCRD: CPT | Mod: CPTII,,, | Performed by: NURSE PRACTITIONER

## 2022-12-09 PROCEDURE — 3008F PR BODY MASS INDEX (BMI) DOCUMENTED: ICD-10-PCS | Mod: CPTII,,, | Performed by: NURSE PRACTITIONER

## 2022-12-09 PROCEDURE — 3066F PR DOCUMENTATION OF TREATMENT FOR NEPHROPATHY: ICD-10-PCS | Mod: CPTII,,, | Performed by: NURSE PRACTITIONER

## 2022-12-09 PROCEDURE — 1159F PR MEDICATION LIST DOCUMENTED IN MEDICAL RECORD: ICD-10-PCS | Mod: CPTII,,, | Performed by: NURSE PRACTITIONER

## 2022-12-09 PROCEDURE — 3066F NEPHROPATHY DOC TX: CPT | Mod: CPTII,,, | Performed by: NURSE PRACTITIONER

## 2022-12-09 PROCEDURE — 3078F PR MOST RECENT DIASTOLIC BLOOD PRESSURE < 80 MM HG: ICD-10-PCS | Mod: CPTII,,, | Performed by: NURSE PRACTITIONER

## 2022-12-09 PROCEDURE — 99213 OFFICE O/P EST LOW 20 MIN: CPT | Mod: ,,, | Performed by: NURSE PRACTITIONER

## 2022-12-09 PROCEDURE — 1160F PR REVIEW ALL MEDS BY PRESCRIBER/CLIN PHARMACIST DOCUMENTED: ICD-10-PCS | Mod: CPTII,,, | Performed by: NURSE PRACTITIONER

## 2022-12-09 PROCEDURE — 4010F PR ACE/ARB THEARPY RXD/TAKEN: ICD-10-PCS | Mod: CPTII,,, | Performed by: NURSE PRACTITIONER

## 2022-12-09 PROCEDURE — 3074F SYST BP LT 130 MM HG: CPT | Mod: CPTII,,, | Performed by: NURSE PRACTITIONER

## 2022-12-09 PROCEDURE — 1160F RVW MEDS BY RX/DR IN RCRD: CPT | Mod: CPTII,,, | Performed by: NURSE PRACTITIONER

## 2022-12-09 PROCEDURE — 1125F PR PAIN SEVERITY QUANTIFIED, PAIN PRESENT: ICD-10-PCS | Mod: CPTII,,, | Performed by: NURSE PRACTITIONER

## 2022-12-09 PROCEDURE — 3078F DIAST BP <80 MM HG: CPT | Mod: CPTII,,, | Performed by: NURSE PRACTITIONER

## 2022-12-09 PROCEDURE — 3074F PR MOST RECENT SYSTOLIC BLOOD PRESSURE < 130 MM HG: ICD-10-PCS | Mod: CPTII,,, | Performed by: NURSE PRACTITIONER

## 2022-12-09 PROCEDURE — 4010F ACE/ARB THERAPY RXD/TAKEN: CPT | Mod: CPTII,,, | Performed by: NURSE PRACTITIONER

## 2022-12-09 PROCEDURE — 1101F PR PT FALLS ASSESS DOC 0-1 FALLS W/OUT INJ PAST YR: ICD-10-PCS | Mod: CPTII,,, | Performed by: NURSE PRACTITIONER

## 2022-12-09 PROCEDURE — 3008F BODY MASS INDEX DOCD: CPT | Mod: CPTII,,, | Performed by: NURSE PRACTITIONER

## 2022-12-09 PROCEDURE — 99213 PR OFFICE/OUTPT VISIT, EST, LEVL III, 20-29 MIN: ICD-10-PCS | Mod: ,,, | Performed by: NURSE PRACTITIONER

## 2022-12-09 PROCEDURE — 3288F FALL RISK ASSESSMENT DOCD: CPT | Mod: CPTII,,, | Performed by: NURSE PRACTITIONER

## 2022-12-09 PROCEDURE — 1101F PT FALLS ASSESS-DOCD LE1/YR: CPT | Mod: CPTII,,, | Performed by: NURSE PRACTITIONER

## 2022-12-09 RX ORDER — ACETAMINOPHEN AND CODEINE PHOSPHATE 300; 30 MG/1; MG/1
1 TABLET ORAL EVERY 8 HOURS PRN
COMMUNITY
Start: 2022-12-05 | End: 2023-03-24

## 2022-12-09 RX ORDER — PENICILLIN V POTASSIUM 500 MG/1
500 TABLET, FILM COATED ORAL EVERY 12 HOURS
COMMUNITY
Start: 2022-12-05 | End: 2023-03-24

## 2022-12-09 RX ORDER — FAMOTIDINE 40 MG/1
40 TABLET, FILM COATED ORAL DAILY
COMMUNITY
Start: 2022-11-11

## 2022-12-09 RX ORDER — ERYTHROMYCIN 5 MG/G
OINTMENT OPHTHALMIC
COMMUNITY
Start: 2022-10-12 | End: 2023-03-24

## 2023-03-23 PROBLEM — E55.9 VITAMIN D DEFICIENCY: Status: RESOLVED | Noted: 2022-06-22 | Resolved: 2023-03-23

## 2023-03-23 NOTE — ASSESSMENT & PLAN NOTE
At goal - 2 Gram NA diet, Monitor BP; Continue Lisinopril / HCTZ 20/12.5 mg po daily; Metoprolol 25 mg po BID - She does not take her second daily dose of Metoprolol if her SBP is < 110

## 2023-03-23 NOTE — PROGRESS NOTES
Pt Name:  Estefania Bravo  Pt :  1950  Pt MRN:  [id number]    Date: 3/24/2023    Reason for visit:   Estefania Bravo is a 73 y.o. aa female presenting for CKD follow up with serum creatinine 2.97, eGFR 16 and Chronic Kidney Disease Stage IV.     Chief Complaint:   The patient denies any complaints today.    HPI:  The patient is being seen today for follow up CKD and the status of her kidney function. Since the last visit, patient reports no health changes.  The patient denies fatigue, weakness,  metallic taste, nausea, cramping, chest pain, palpitations, orthopnea, PND, edema, trouble concentrating, decreased urination.  She c/o SOB with exertion and poor appetite.     Home BPs when checked are <130/80 per patient. She reports she does not take her second dose of Metoprolol if her BP is < 110 systolic.  The patient is following a low sodium diet. The patient is avoiding NSAIDs. The patient is drinking 64 oz of water daily.     Since last visit on 22 patient reports no changes in medical history.      History:   Past Medical History:   Diagnosis Date    Disorder of kidney and ureter     Diverticulosis     DM (diabetes mellitus)     GERD (gastroesophageal reflux disease)     Graves disease     Hiatal hernia     HTN (hypertension)     Hypothyroidism, postablative     Osteoporosis     Persistent proteinuria 2022    Personal history of colonic polyps     Schatzki's ring     Stroke     Vitamin D deficiency 2022     Past Surgical History:   Procedure Laterality Date    CARDIAC CATHETERIZATION      CHOLECYSTECTOMY      COLONOSCOPY      THYROID ABLATION      TUBAL LIGATION      UPPER GASTROINTESTINAL ENDOSCOPY       Family History   Problem Relation Age of Onset    Heart disease Mother     Heart disease Father     Cancer Sister     Kidney failure Sister      Social History     Substance and Sexual Activity   Alcohol Use Not Currently     Social History     Substance and Sexual Activity   Drug  Use Not Currently     Social History     Substance and Sexual Activity   Sexual Activity Not Currently     reports that she is not currently sexually active.  Social History     Tobacco Use   Smoking Status Former   Smokeless Tobacco Never       Allergies:  Review of patient's allergies indicates:   Allergen Reactions    Denosumab Swelling         Current Outpatient Medications:     aspirin 325 MG tablet, Take 325 mg by mouth once daily., Disp: , Rfl:     atorvastatin (LIPITOR) 80 MG tablet, Take 80 mg by mouth every evening., Disp: , Rfl:     calcitRIOL (ROCALTROL) 0.25 MCG Cap, Take once capsule on Monday - Wednesday - Friday (Patient taking differently: Take 0.25 mcg by mouth every other day. Take once capsule on Monday - Wednesday - Friday), Disp: 30 capsule, Rfl: 11    cholecalciferol, vitamin D3, (VITAMIN D3) 50 mcg (2,000 unit) Cap capsule, Take 1,000 Units by mouth once daily., Disp: , Rfl:     dapagliflozin (FARXIGA) 10 mg tablet, Take 10 mg by mouth once daily., Disp: , Rfl:     famotidine (PEPCID) 40 MG tablet, Take 40 mg by mouth Daily., Disp: , Rfl:     FLUoxetine 10 MG capsule, Take 1 capsule by mouth once daily., Disp: , Rfl:     gabapentin (NEURONTIN) 100 MG capsule, Take 100 mg by mouth nightly., Disp: , Rfl:     latanoprost 0.005 % ophthalmic solution, Place 1 drop into both eyes Daily., Disp: , Rfl:     levothyroxine (SYNTHROID) 100 MCG tablet, Take 100 mcg by mouth once daily at 6am. Every morning, Disp: , Rfl:     lisinopriL-hydrochlorothiazide (PRINZIDE,ZESTORETIC) 20-12.5 mg per tablet, Take 1 tablet by mouth once daily., Disp: , Rfl:     methocarbamoL (ROBAXIN) 500 MG Tab, Take 500 mg by mouth 3 (three) times daily., Disp: , Rfl:     metoprolol tartrate (LOPRESSOR) 25 MG tablet, Take 25 mg by mouth 2 (two) times daily., Disp: , Rfl:     omeprazole (PRILOSEC) 20 MG capsule, Take 1 capsule by mouth once daily., Disp: , Rfl:     pioglitazone (ACTOS) 30 MG tablet, Take 30 mg by mouth once daily.  am, Disp: , Rfl:     ROS:  Review of Systems   Constitutional:  Positive for appetite change. Negative for activity change.   HENT:  Negative for hearing loss.    Eyes:  Negative for visual disturbance.   Respiratory:  Positive for shortness of breath. Negative for wheezing.    Cardiovascular:  Negative for chest pain.   Gastrointestinal:  Negative for abdominal pain, diarrhea, nausea and vomiting.   Genitourinary:  Negative for decreased urine volume, dysuria, flank pain, frequency and urgency.   Neurological:  Negative for dizziness, weakness and headaches.     Physical Exam:   Vitals:   Vitals:    03/24/23 1111 03/24/23 1127   BP: (!) 108/46    Pulse: 78    SpO2: 97%    Weight:  106.6 kg (235 lb)   Height:  5' (1.524 m)   PainSc:  0-No pain     Body mass index is 45.9 kg/m².    Physical Exam  Vitals reviewed.   Constitutional:       General: She is not in acute distress.     Appearance: Normal appearance. She is morbidly obese.   HENT:      Head: Normocephalic and atraumatic.      Mouth/Throat:      Mouth: Mucous membranes are moist.   Eyes:      Extraocular Movements: Extraocular movements intact.      Pupils: Pupils are equal, round, and reactive to light.   Cardiovascular:      Rate and Rhythm: Normal rate and regular rhythm.      Heart sounds: No murmur heard.  Pulmonary:      Effort: Pulmonary effort is normal.      Breath sounds: No wheezing, rhonchi or rales.   Musculoskeletal:         General: No swelling.   Skin:     General: Skin is warm and dry.   Neurological:      Mental Status: She is alert and oriented to person, place, and time.   Psychiatric:         Mood and Affect: Mood normal.         Behavior: Behavior normal.         Labs/Tests:    RENAL FUNCTION PANEL     Ref Range & Units 09:07   Sodium 136 - 147 mmol/L 138    Potassium 3.5 - 5.1 mmol/L 4.2    Chloride 98 - 107 mmol/L 106    CO2 20 - 31 mmol/L 25    Glucose 70 - 99 mg/dL 102 High     BUN 9 - 23 mg/dL 40 High     Creatinine 0.55 - 1.02  mg/dL 2.97 High     Calcium 8.3 - 10.6 mg/dL 9.4    Phosphorus Level 2.4 - 5.1 mg/dL 4.1    Albumin Level 3.2 - 4.8 g/dL 3.7    Anion Gap 5.0 - 15.0 mmol/L 6.7    eGFR CKD-EPI >90 ml/min/1.73m2 16 Low       Lab Results   Component Value Date     (H) 03/23/2023    HGB 13.1 03/23/2023    HGB 13.9 12/07/2022    HGB 13.1 10/17/2022    HGBA1C 6.9 (H) 07/19/2022    TRIG 181 (H) 07/19/2022    CHOL 134 07/19/2022    HDL 23 (L) 07/19/2022    LDLCALC 74 07/19/2022    LABVLDL 36 (H) 07/19/2022    [id number] 40.8 03/23/2023       Lab Results   Component Value Date    UPROTCREA 0.4 (H) 03/23/2023    UPROTCREA 0.7 (H) 12/07/2022    UPROTCREA 1.2 (H) 08/29/2022         Diagnosis:  Plan and Assessment:  1. Type 2 diabetes mellitus with stage 4 chronic kidney disease, without long-term current use of insulin  Assessment & Plan:  Controlled with HgbA1c 6.9% on 7/19/22 - Continue Actos 30 mg po daily, Farxiga 10 mg po daily     Orders:  -     Renal Function Panel; Future; Expected date: 06/24/2023  -     Protein/Creatinine Ratio, Urine; Future; Expected date: 06/24/2023    2. Hypertension associated with stage 4 chronic kidney disease due to type 2 diabetes mellitus  Assessment & Plan:  At goal - 2 Gram NA diet, Monitor BP; Continue Lisinopril / HCTZ 20/12.5 mg po daily; Metoprolol 25 mg po BID - She does not take her second daily dose of Metoprolol if her SBP is < 110     Orders:  -     Renal Function Panel; Future; Expected date: 06/24/2023    3. Chronic kidney disease, stage IV (severe)  Assessment & Plan:  Serum creatinine 2.97 / eGFR 16 (2.89 / eGFR 17 on 12/7/22) - without anemia - Avoid NSAIDS, Assure 64 oz of water daily, Meds per Med list above.      She underwent Left upper arm AV graft removal and pericardial patching of the brachial artery due to steal syndrome in the OR on 10/20/22 by Dr. Patrick.    She was referred to CKD Smart Class and attended via telephone.       Orders:  -     Hemoglobin; Future; Expected  date: 06/24/2023  -     Vitamin D; Future; Expected date: 06/24/2023  -     Renal Function Panel; Future; Expected date: 06/24/2023  -     PTH, Intact; Future; Expected date: 06/24/2023  -     Protein/Creatinine Ratio, Urine; Future; Expected date: 06/24/2023    4. Secondary hyperparathyroidism of renal origin  Assessment & Plan:   - Continue Rocaltrol 25 mcg po QOD     Orders:  -     Vitamin D; Future; Expected date: 06/24/2023  -     PTH, Intact; Future; Expected date: 06/24/2023    5. Non-nephrotic range proteinuria  Assessment & Plan:  400 mg - down from 700 mg  -  ACE / SGLT2     Orders:  -     Protein/Creatinine Ratio, Urine; Future; Expected date: 06/24/2023    6. Hyperlipidemia, mixed  Assessment & Plan:  Continue Lipitor 80 mg po nightly       7. Morbid obesity  Assessment & Plan:  BMI 45.90 - she walks for exercise              My reconciliation of medication should not condone or support use of medications that have been previously prescribed for patients by other providers.  I am only documenting the current medications patients is taking and may change doses, add or discontinue medications based on information provided by the patient.     The patient has been provided with their current level of kidney function including eGFR and creatinine.    We discussed the potential for common complications of CKD including anemia, electrolyte abnormalities, abnormal fluid balance, mineral bone disease and malnutrition.    We discussed strategies to slow the progression of their kidney disease including:  Avoid nephrotoxic agents. Avoid over-the-counter and prescription NSAIDs (Ibuprofren, Motrin, Naproxyn, Aleve, Mobic, Celebrex, Toradol, Advil). All of these can worsen kidney function, elevate BP, cause fluid retention/swelling and elevate potassium. Avoid iodine contrast agents and gadolinium, which can worsen kidney function and/or cause kidney failure. Avoid phosphosoda bowel preps which can worsen  kidney function.  Work to improve modifiable risk factors. Aim for good control of blood glucose without episodes of hypoglycemia. Notify the provider managing your diabetes if your blood glucose < 60. Aim for good blood pressure control without episodes of hypotension. Call the office if your systolic blood pressure is consistently < 110. Aim for good control of your cholesterol.  AIC goal <7.0  BP goal <130/80        Keeping these in goal range will help prevent progression of cardiovascular disease            and chronic kidney disease.    We discussed dietary modifications:  Low sodium diet: 2 gm/d or less  Limit/avoid high potassium foods  Avoid potassium containing salt substitutes  Limit/avoid high phosphorus foods  Limit daily protein intake to 0.8-1 gm/kg of your ideal body weight.    We discussed lifestyle modifications:  Make sure you are drinking plenty of fluids--64 ounces (1/2 gallon) daily  Exercise at least 30 minutes 5 x per week (total 150 minutes per week), example brisk walking  Achieve and maintain a healthy weight (BMI 20-25)  Limit alcohol consumption to <2 drinks per day  Stop smoking  Make sure you stay current on important vaccines-- pneumonia vaccines (Pneumovax and Prevnar), flu vaccine, Hepatitis B (especially patients nearing renal replacement therapy and planning hemodialysis) and Covid-19 vaccine.     Recommendations:  Monitor your BP at home daily and record.  Bring readings to your next appt.  Call the office if your BP is persistently >130/80.  Seek urgent medical attention with signs and symptoms of uremia - extreme weakness, fatigue, confusion, anorexia, metallic taste in mouth, hiccoughs, cramping, itching, chest pain, swelling, or trouble sleeping.    Follow Up:   Follow up in about 3 months (around 6/24/2023).

## 2023-03-23 NOTE — ASSESSMENT & PLAN NOTE
Serum creatinine 2.97 / eGFR 16 (2.89 / eGFR 17 on 12/7/22) - without anemia - Avoid NSAIDS, Assure 64 oz of water daily, Meds per Med list above.      She underwent Left upper arm AV graft removal and pericardial patching of the brachial artery due to steal syndrome in the OR on 10/20/22 by Dr. Patrick.    She was referred to CKD Smart Class and attended via telephone.

## 2023-03-24 ENCOUNTER — OFFICE VISIT (OUTPATIENT)
Dept: NEPHROLOGY | Facility: CLINIC | Age: 73
End: 2023-03-24
Payer: MEDICARE

## 2023-03-24 VITALS
SYSTOLIC BLOOD PRESSURE: 108 MMHG | HEIGHT: 60 IN | OXYGEN SATURATION: 97 % | HEART RATE: 78 BPM | DIASTOLIC BLOOD PRESSURE: 46 MMHG | WEIGHT: 235 LBS | BODY MASS INDEX: 46.13 KG/M2

## 2023-03-24 DIAGNOSIS — E11.22 TYPE 2 DIABETES MELLITUS WITH STAGE 4 CHRONIC KIDNEY DISEASE, WITHOUT LONG-TERM CURRENT USE OF INSULIN: ICD-10-CM

## 2023-03-24 DIAGNOSIS — N18.4 TYPE 2 DIABETES MELLITUS WITH STAGE 4 CHRONIC KIDNEY DISEASE, WITHOUT LONG-TERM CURRENT USE OF INSULIN: ICD-10-CM

## 2023-03-24 DIAGNOSIS — N25.81 SECONDARY HYPERPARATHYROIDISM OF RENAL ORIGIN: ICD-10-CM

## 2023-03-24 DIAGNOSIS — N18.4 HYPERTENSION ASSOCIATED WITH STAGE 4 CHRONIC KIDNEY DISEASE DUE TO TYPE 2 DIABETES MELLITUS: ICD-10-CM

## 2023-03-24 DIAGNOSIS — I12.9 HYPERTENSION ASSOCIATED WITH STAGE 4 CHRONIC KIDNEY DISEASE DUE TO TYPE 2 DIABETES MELLITUS: ICD-10-CM

## 2023-03-24 DIAGNOSIS — R80.9 NON-NEPHROTIC RANGE PROTEINURIA: ICD-10-CM

## 2023-03-24 DIAGNOSIS — E11.22 HYPERTENSION ASSOCIATED WITH STAGE 4 CHRONIC KIDNEY DISEASE DUE TO TYPE 2 DIABETES MELLITUS: ICD-10-CM

## 2023-03-24 DIAGNOSIS — E78.2 HYPERLIPIDEMIA, MIXED: ICD-10-CM

## 2023-03-24 DIAGNOSIS — E66.01 MORBID OBESITY: ICD-10-CM

## 2023-03-24 DIAGNOSIS — N18.4 CHRONIC KIDNEY DISEASE, STAGE IV (SEVERE): ICD-10-CM

## 2023-03-24 PROCEDURE — 3078F PR MOST RECENT DIASTOLIC BLOOD PRESSURE < 80 MM HG: ICD-10-PCS | Mod: CPTII,S$GLB,, | Performed by: NURSE PRACTITIONER

## 2023-03-24 PROCEDURE — 4010F PR ACE/ARB THEARPY RXD/TAKEN: ICD-10-PCS | Mod: CPTII,S$GLB,, | Performed by: NURSE PRACTITIONER

## 2023-03-24 PROCEDURE — 3288F FALL RISK ASSESSMENT DOCD: CPT | Mod: CPTII,S$GLB,, | Performed by: NURSE PRACTITIONER

## 2023-03-24 PROCEDURE — 1159F PR MEDICATION LIST DOCUMENTED IN MEDICAL RECORD: ICD-10-PCS | Mod: CPTII,S$GLB,, | Performed by: NURSE PRACTITIONER

## 2023-03-24 PROCEDURE — 1101F PT FALLS ASSESS-DOCD LE1/YR: CPT | Mod: CPTII,S$GLB,, | Performed by: NURSE PRACTITIONER

## 2023-03-24 PROCEDURE — 3008F BODY MASS INDEX DOCD: CPT | Mod: CPTII,S$GLB,, | Performed by: NURSE PRACTITIONER

## 2023-03-24 PROCEDURE — 3288F PR FALLS RISK ASSESSMENT DOCUMENTED: ICD-10-PCS | Mod: CPTII,S$GLB,, | Performed by: NURSE PRACTITIONER

## 2023-03-24 PROCEDURE — 1126F AMNT PAIN NOTED NONE PRSNT: CPT | Mod: CPTII,S$GLB,, | Performed by: NURSE PRACTITIONER

## 2023-03-24 PROCEDURE — 1101F PR PT FALLS ASSESS DOC 0-1 FALLS W/OUT INJ PAST YR: ICD-10-PCS | Mod: CPTII,S$GLB,, | Performed by: NURSE PRACTITIONER

## 2023-03-24 PROCEDURE — 1160F RVW MEDS BY RX/DR IN RCRD: CPT | Mod: CPTII,S$GLB,, | Performed by: NURSE PRACTITIONER

## 2023-03-24 PROCEDURE — 99213 OFFICE O/P EST LOW 20 MIN: CPT | Mod: S$GLB,,, | Performed by: NURSE PRACTITIONER

## 2023-03-24 PROCEDURE — 1160F PR REVIEW ALL MEDS BY PRESCRIBER/CLIN PHARMACIST DOCUMENTED: ICD-10-PCS | Mod: CPTII,S$GLB,, | Performed by: NURSE PRACTITIONER

## 2023-03-24 PROCEDURE — 1159F MED LIST DOCD IN RCRD: CPT | Mod: CPTII,S$GLB,, | Performed by: NURSE PRACTITIONER

## 2023-03-24 PROCEDURE — 3074F PR MOST RECENT SYSTOLIC BLOOD PRESSURE < 130 MM HG: ICD-10-PCS | Mod: CPTII,S$GLB,, | Performed by: NURSE PRACTITIONER

## 2023-03-24 PROCEDURE — 99213 PR OFFICE/OUTPT VISIT, EST, LEVL III, 20-29 MIN: ICD-10-PCS | Mod: S$GLB,,, | Performed by: NURSE PRACTITIONER

## 2023-03-24 PROCEDURE — 3078F DIAST BP <80 MM HG: CPT | Mod: CPTII,S$GLB,, | Performed by: NURSE PRACTITIONER

## 2023-03-24 PROCEDURE — 1126F PR PAIN SEVERITY QUANTIFIED, NO PAIN PRESENT: ICD-10-PCS | Mod: CPTII,S$GLB,, | Performed by: NURSE PRACTITIONER

## 2023-03-24 PROCEDURE — 3074F SYST BP LT 130 MM HG: CPT | Mod: CPTII,S$GLB,, | Performed by: NURSE PRACTITIONER

## 2023-03-24 PROCEDURE — 4010F ACE/ARB THERAPY RXD/TAKEN: CPT | Mod: CPTII,S$GLB,, | Performed by: NURSE PRACTITIONER

## 2023-03-24 PROCEDURE — 3066F NEPHROPATHY DOC TX: CPT | Mod: CPTII,S$GLB,, | Performed by: NURSE PRACTITIONER

## 2023-03-24 PROCEDURE — 3008F PR BODY MASS INDEX (BMI) DOCUMENTED: ICD-10-PCS | Mod: CPTII,S$GLB,, | Performed by: NURSE PRACTITIONER

## 2023-03-24 PROCEDURE — 3066F PR DOCUMENTATION OF TREATMENT FOR NEPHROPATHY: ICD-10-PCS | Mod: CPTII,S$GLB,, | Performed by: NURSE PRACTITIONER

## 2023-03-24 RX ORDER — METHOCARBAMOL 500 MG/1
500 TABLET, FILM COATED ORAL
COMMUNITY

## 2023-03-24 RX ORDER — LATANOPROST 50 UG/ML
1 SOLUTION/ DROPS OPHTHALMIC
COMMUNITY
Start: 2022-12-24

## 2023-05-24 RX ORDER — CALCITRIOL 0.25 UG/1
0.25 CAPSULE ORAL
Qty: 30 CAPSULE | Refills: 11 | Status: SHIPPED | OUTPATIENT
Start: 2023-05-24 | End: 2023-06-30

## 2023-06-29 NOTE — PROGRESS NOTES
Pt Name:  Estefania Bravo  Pt :  1950  Pt MRN:  [id number]    Date: 2023    Reason for visit:   Estefania Bravo is a 73 y.o. aa female presenting for CKD follow up with serum creatinine 2.94, eGFR 16 and Chronic Kidney Disease Stage IV.     Chief Complaint:   The patient denies any complaints today.    HPI:  The patient is being seen today for follow up CKD and the status of her kidney function. Since the last visit, patient reports she was started on Tymlos 80 mcg SQ injections daily for her Osteoporosis approximately 2 weeks ago.  The patient denies fatigue, weakness, poor appetite, metallic taste, nausea, cramping, chest pain, palpitations, SOB, orthopnea, PND, edema, trouble concentrating, decreased urination.      Home BPs when checked are <130/80 per patient. The patient is following a low sodium diet. The patient is avoiding NSAIDs. The patient is drinking 64 oz of water daily.     Since last visit on 3/24/23 patient reports above noted changes in medical history.      History:   Past Medical History:   Diagnosis Date    Disorder of kidney and ureter     Diverticulosis     DM (diabetes mellitus)     GERD (gastroesophageal reflux disease)     Graves disease     Hiatal hernia     HTN (hypertension)     Hypothyroidism, postablative     Osteoporosis     Persistent proteinuria 2022    Personal history of colonic polyps     Schatzki's ring     Stroke     Vitamin D deficiency 2022     Past Surgical History:   Procedure Laterality Date    CARDIAC CATHETERIZATION      CHOLECYSTECTOMY      COLONOSCOPY      THYROID ABLATION      TUBAL LIGATION      UPPER GASTROINTESTINAL ENDOSCOPY       Family History   Problem Relation Age of Onset    Heart disease Mother     Heart disease Father     Cancer Sister     Kidney failure Sister      Social History     Substance and Sexual Activity   Alcohol Use Not Currently     Social History     Substance and Sexual Activity   Drug Use Not Currently      Social History     Substance and Sexual Activity   Sexual Activity Not Currently     reports that she is not currently sexually active.  Social History     Tobacco Use   Smoking Status Former   Smokeless Tobacco Never       Allergies:  Review of patient's allergies indicates:   Allergen Reactions    Denosumab Swelling         Current Outpatient Medications:     abaloparatide (TYMLOS) 80 mcg (3,120 mcg/1.56 mL) PnIj, Inject 80 mcg into the skin once daily., Disp: , Rfl:     aspirin 325 MG tablet, Take 325 mg by mouth once daily., Disp: , Rfl:     atorvastatin (LIPITOR) 80 MG tablet, Take 80 mg by mouth every evening., Disp: , Rfl:     b complex vitamins tablet, Take 1 tablet by mouth once daily., Disp: , Rfl:     cholecalciferol, vitamin D3, (VITAMIN D3) 50 mcg (2,000 unit) Cap capsule, Take 1,000 Units by mouth once daily., Disp: , Rfl:     dapagliflozin (FARXIGA) 10 mg tablet, Take 10 mg by mouth once daily., Disp: , Rfl:     famotidine (PEPCID) 40 MG tablet, Take 40 mg by mouth Daily., Disp: , Rfl:     FLUoxetine 10 MG capsule, Take 1 capsule by mouth once daily., Disp: , Rfl:     gabapentin (NEURONTIN) 100 MG capsule, Take 100 mg by mouth nightly., Disp: , Rfl:     latanoprost 0.005 % ophthalmic solution, Place 1 drop into both eyes Daily., Disp: , Rfl:     levothyroxine (SYNTHROID) 100 MCG tablet, Take 100 mcg by mouth once daily at 6am. Every morning, Disp: , Rfl:     lisinopriL-hydrochlorothiazide (PRINZIDE,ZESTORETIC) 20-12.5 mg per tablet, Take 1 tablet by mouth once daily., Disp: , Rfl:     methocarbamoL (ROBAXIN) 500 MG Tab, Take 500 mg by mouth as needed., Disp: , Rfl:     metoprolol tartrate (LOPRESSOR) 25 MG tablet, Take 25 mg by mouth 2 (two) times daily., Disp: , Rfl:     pioglitazone (ACTOS) 30 MG tablet, Take 30 mg by mouth once daily. am, Disp: , Rfl:     calcitRIOL (ROCALTROL) 0.25 MCG Cap, Take 1 capsule (0.25 mcg total) by mouth once daily., Disp: 30 capsule, Rfl: 11    ROS:  Review of  Systems   Constitutional:  Negative for activity change and appetite change.   HENT:  Negative for hearing loss.    Eyes:  Negative for visual disturbance.   Respiratory:  Negative for shortness of breath and wheezing.    Cardiovascular:  Negative for chest pain.   Gastrointestinal:  Negative for abdominal pain, diarrhea, nausea and vomiting.   Genitourinary:  Negative for decreased urine volume, dysuria, flank pain, frequency and urgency.   Neurological:  Negative for dizziness, weakness and headaches.     Physical Exam:   Vitals:   Vitals:    06/30/23 1006 06/30/23 1008   BP: (!) 105/46 (!) 100/41   Pulse: 85    SpO2: 98%    Weight: 104.8 kg (231 lb)    Height: 5' (1.524 m)      Body mass index is 45.11 kg/m².    Physical Exam  Vitals reviewed.   Constitutional:       General: She is not in acute distress.     Appearance: Normal appearance. She is morbidly obese.   HENT:      Head: Normocephalic and atraumatic.      Mouth/Throat:      Mouth: Mucous membranes are moist.   Eyes:      Extraocular Movements: Extraocular movements intact.      Pupils: Pupils are equal, round, and reactive to light.   Cardiovascular:      Rate and Rhythm: Normal rate and regular rhythm.      Heart sounds: No murmur heard.  Pulmonary:      Effort: Pulmonary effort is normal.      Breath sounds: No wheezing, rhonchi or rales.   Musculoskeletal:         General: No swelling.   Skin:     General: Skin is warm and dry.   Neurological:      Mental Status: She is alert and oriented to person, place, and time.   Psychiatric:         Mood and Affect: Mood normal.         Behavior: Behavior normal.         Labs/Tests:    Contains abnormal data RENAL FUNCTION PANEL  6/28/23      Ref Range & Units 1 d ago   Sodium 136 - 147 mmol/L 142    Potassium 3.5 - 5.1 mmol/L 4.4    Chloride 98 - 107 mmol/L 109 High     CO2 20 - 31 mmol/L 24    Glucose 70 - 99 mg/dL 94    BUN 9 - 23 mg/dL 40 High     Creatinine 0.55 - 1.02 mg/dL 2.94 High     Calcium 8.3 -  10.6 mg/dL 8.9    Phosphorus Level 2.4 - 5.1 mg/dL 3.8    Albumin Level 3.2 - 4.8 g/dL 3.9    Anion Gap 5.0 - 15.0 mmol/L 8.5    eGFR CKD-EPI >90 ml/min/1.73m2 16 Low       Lab Results   Component Value Date     (H) 06/28/2023    HGB 12.8 06/28/2023    HGB 13.1 03/23/2023    HGB 13.9 12/07/2022    HGBA1C 5.6 05/03/2023    TRIG 181 (H) 07/19/2022    CHOL 134 07/19/2022    HDL 23 (L) 07/19/2022    LDLCALC 74 07/19/2022    LABVLDL 36 (H) 07/19/2022    [id number] 53.6 06/28/2023       Lab Results   Component Value Date    UPROTCREA 0.7 (H) 06/28/2023    UPROTCREA 0.4 (H) 03/23/2023    UPROTCREA 0.7 (H) 12/07/2022         Diagnosis:  Plan and Assessment:  1. Type 2 diabetes mellitus with stage 4 chronic kidney disease, without long-term current use of insulin  Assessment & Plan:  Controlled with HgbA1c 5.6% on 5/3/23 - Continue Actos 30 mg po daily, Farxiga 10 mg po daily     Orders:  -     Renal Function Panel; Future; Expected date: 09/30/2023  -     Protein/Creatinine Ratio, Urine; Future; Expected date: 09/30/2023    2. Hypertension associated with stage 4 chronic kidney disease due to type 2 diabetes mellitus  Assessment & Plan:  At goal - 2 Gram NA diet, Monitor BP; Continue Lisinopril / HCTZ 20/12.5 mg po daily; Metoprolol 25 mg po BID - She does not take her second daily dose of Metoprolol if her SBP is < 110     Orders:  -     Renal Function Panel; Future; Expected date: 09/30/2023    3. Chronic kidney disease, stage IV (severe)  Assessment & Plan:  Serum creatinine 2.94 / eGFR 16; (2.97 / eGFR 16 on 3/23/23 and 2.89 / eGFR 17 on 12/7/22) - without anemia - Avoid NSAIDS, Assure 64 oz of water daily, Meds per Med list above.      She underwent Left upper arm AV graft removal and pericardial patching of the brachial artery due to steal syndrome in the OR on 10/20/22 by Dr. Patrick.  Non-functioning.     She was referred to CKD Smart Class and attended via telephone.       Orders:  -     Hemoglobin; Future;  Expected date: 09/30/2023  -     Vitamin D; Future; Expected date: 09/30/2023  -     Renal Function Panel; Future; Expected date: 09/30/2023  -     PTH, Intact; Future; Expected date: 09/30/2023  -     Protein/Creatinine Ratio, Urine; Future; Expected date: 09/30/2023    4. Secondary hyperparathyroidism of renal origin  Assessment & Plan:   - up from 167 - Increase Rocaltrol 25 mcg po to daily     Orders:  -     Vitamin D; Future; Expected date: 09/30/2023  -     PTH, Intact; Future; Expected date: 09/30/2023    5. Non-nephrotic range proteinuria  Assessment & Plan:  700 mg - up from 400 mg -   ACE / SGLT2     Orders:  -     Protein/Creatinine Ratio, Urine; Future; Expected date: 09/30/2023    6. Hyperlipidemia, mixed  Assessment & Plan:  Continue Lipitor 80 mg po nightly       7. Morbid obesity  Assessment & Plan:  BMI 45.11 - she walks for exercise - She is down 4 lbs since her last OV in 3/23.       Other orders  -     calcitRIOL (ROCALTROL) 0.25 MCG Cap; Take 1 capsule (0.25 mcg total) by mouth once daily.  Dispense: 30 capsule; Refill: 11           My reconciliation of medication should not condone or support use of medications that have been previously prescribed for patients by other providers.  I am only documenting the current medications patients is taking and may change doses, add or discontinue medications based on information provided by the patient.     The patient has been provided with their current level of kidney function including eGFR and creatinine.    We discussed the potential for common complications of CKD including anemia, electrolyte abnormalities, abnormal fluid balance, mineral bone disease and malnutrition.    We discussed strategies to slow the progression of their kidney disease including:  Avoid nephrotoxic agents. Avoid over-the-counter and prescription NSAIDs (Ibuprofren, Motrin, Naproxyn, Aleve, Mobic, Celebrex, Toradol, Advil). All of these can worsen kidney function,  elevate BP, cause fluid retention/swelling and elevate potassium. Avoid iodine contrast agents and gadolinium, which can worsen kidney function and/or cause kidney failure. Avoid phosphosoda bowel preps which can worsen kidney function.  Work to improve modifiable risk factors. Aim for good control of blood glucose without episodes of hypoglycemia. Notify the provider managing your diabetes if your blood glucose < 60. Aim for good blood pressure control without episodes of hypotension. Call the office if your systolic blood pressure is consistently < 110. Aim for good control of your cholesterol.  AIC goal <7.0  BP goal <130/80        Keeping these in goal range will help prevent progression of cardiovascular disease            and chronic kidney disease.    We discussed dietary modifications:  Low sodium diet: 2 gm/d or less  Limit/avoid high potassium foods  Avoid potassium containing salt substitutes  Limit/avoid high phosphorus foods  Limit daily protein intake to 0.8-1 gm/kg of your ideal body weight.    We discussed lifestyle modifications:  Make sure you are drinking plenty of fluids--64 ounces (1/2 gallon) daily  Exercise at least 30 minutes 5 x per week (total 150 minutes per week), example brisk walking  Achieve and maintain a healthy weight (BMI 20-25)  Limit alcohol consumption to <2 drinks per day  Stop smoking  Make sure you stay current on important vaccines-- pneumonia vaccines (Pneumovax and Prevnar), flu vaccine, Hepatitis B (especially patients nearing renal replacement therapy and planning hemodialysis) and Covid-19 vaccine.     Recommendations:  Monitor your BP at home daily and record.  Bring readings to your next appt.  Call the office if your BP is persistently >130/80.  Seek urgent medical attention with signs and symptoms of uremia - extreme weakness, fatigue, confusion, anorexia, metallic taste in mouth, hiccoughs, cramping, itching, chest pain, swelling, or trouble sleeping.    Follow Up:    Follow up in about 3 months (around 9/30/2023).

## 2023-06-29 NOTE — ASSESSMENT & PLAN NOTE
Serum creatinine 2.94 / eGFR 16; (2.97 / eGFR 16 on 3/23/23 and 2.89 / eGFR 17 on 12/7/22) - without anemia - Avoid NSAIDS, Assure 64 oz of water daily, Meds per Med list above.      She underwent Left upper arm AV graft removal and pericardial patching of the brachial artery due to steal syndrome in the OR on 10/20/22 by Dr. Patrick.  Non-functioning.     She was referred to CKD Smart Class and attended via telephone.

## 2023-06-30 ENCOUNTER — OFFICE VISIT (OUTPATIENT)
Dept: NEPHROLOGY | Facility: CLINIC | Age: 73
End: 2023-06-30
Payer: MEDICARE

## 2023-06-30 VITALS
OXYGEN SATURATION: 98 % | DIASTOLIC BLOOD PRESSURE: 41 MMHG | SYSTOLIC BLOOD PRESSURE: 100 MMHG | WEIGHT: 231 LBS | HEIGHT: 60 IN | HEART RATE: 85 BPM | BODY MASS INDEX: 45.35 KG/M2

## 2023-06-30 DIAGNOSIS — N18.4 CHRONIC KIDNEY DISEASE, STAGE IV (SEVERE): ICD-10-CM

## 2023-06-30 DIAGNOSIS — E78.2 HYPERLIPIDEMIA, MIXED: ICD-10-CM

## 2023-06-30 DIAGNOSIS — I12.9 HYPERTENSION ASSOCIATED WITH STAGE 4 CHRONIC KIDNEY DISEASE DUE TO TYPE 2 DIABETES MELLITUS: ICD-10-CM

## 2023-06-30 DIAGNOSIS — N18.4 HYPERTENSION ASSOCIATED WITH STAGE 4 CHRONIC KIDNEY DISEASE DUE TO TYPE 2 DIABETES MELLITUS: ICD-10-CM

## 2023-06-30 DIAGNOSIS — E11.22 HYPERTENSION ASSOCIATED WITH STAGE 4 CHRONIC KIDNEY DISEASE DUE TO TYPE 2 DIABETES MELLITUS: ICD-10-CM

## 2023-06-30 DIAGNOSIS — N18.4 TYPE 2 DIABETES MELLITUS WITH STAGE 4 CHRONIC KIDNEY DISEASE, WITHOUT LONG-TERM CURRENT USE OF INSULIN: ICD-10-CM

## 2023-06-30 DIAGNOSIS — R80.9 NON-NEPHROTIC RANGE PROTEINURIA: ICD-10-CM

## 2023-06-30 DIAGNOSIS — N25.81 SECONDARY HYPERPARATHYROIDISM OF RENAL ORIGIN: ICD-10-CM

## 2023-06-30 DIAGNOSIS — E11.22 TYPE 2 DIABETES MELLITUS WITH STAGE 4 CHRONIC KIDNEY DISEASE, WITHOUT LONG-TERM CURRENT USE OF INSULIN: ICD-10-CM

## 2023-06-30 DIAGNOSIS — E66.01 MORBID OBESITY: ICD-10-CM

## 2023-06-30 PROCEDURE — 1160F PR REVIEW ALL MEDS BY PRESCRIBER/CLIN PHARMACIST DOCUMENTED: ICD-10-PCS | Mod: CPTII,S$GLB,, | Performed by: NURSE PRACTITIONER

## 2023-06-30 PROCEDURE — 3008F BODY MASS INDEX DOCD: CPT | Mod: CPTII,S$GLB,, | Performed by: NURSE PRACTITIONER

## 2023-06-30 PROCEDURE — 99214 OFFICE O/P EST MOD 30 MIN: CPT | Mod: S$GLB,,, | Performed by: NURSE PRACTITIONER

## 2023-06-30 PROCEDURE — 3074F SYST BP LT 130 MM HG: CPT | Mod: CPTII,S$GLB,, | Performed by: NURSE PRACTITIONER

## 2023-06-30 PROCEDURE — 1159F PR MEDICATION LIST DOCUMENTED IN MEDICAL RECORD: ICD-10-PCS | Mod: CPTII,S$GLB,, | Performed by: NURSE PRACTITIONER

## 2023-06-30 PROCEDURE — 3008F PR BODY MASS INDEX (BMI) DOCUMENTED: ICD-10-PCS | Mod: CPTII,S$GLB,, | Performed by: NURSE PRACTITIONER

## 2023-06-30 PROCEDURE — 3066F NEPHROPATHY DOC TX: CPT | Mod: CPTII,S$GLB,, | Performed by: NURSE PRACTITIONER

## 2023-06-30 PROCEDURE — 3074F PR MOST RECENT SYSTOLIC BLOOD PRESSURE < 130 MM HG: ICD-10-PCS | Mod: CPTII,S$GLB,, | Performed by: NURSE PRACTITIONER

## 2023-06-30 PROCEDURE — 99214 PR OFFICE/OUTPT VISIT, EST, LEVL IV, 30-39 MIN: ICD-10-PCS | Mod: S$GLB,,, | Performed by: NURSE PRACTITIONER

## 2023-06-30 PROCEDURE — 1101F PT FALLS ASSESS-DOCD LE1/YR: CPT | Mod: CPTII,S$GLB,, | Performed by: NURSE PRACTITIONER

## 2023-06-30 PROCEDURE — 4010F PR ACE/ARB THEARPY RXD/TAKEN: ICD-10-PCS | Mod: CPTII,S$GLB,, | Performed by: NURSE PRACTITIONER

## 2023-06-30 PROCEDURE — 3066F PR DOCUMENTATION OF TREATMENT FOR NEPHROPATHY: ICD-10-PCS | Mod: CPTII,S$GLB,, | Performed by: NURSE PRACTITIONER

## 2023-06-30 PROCEDURE — 1160F RVW MEDS BY RX/DR IN RCRD: CPT | Mod: CPTII,S$GLB,, | Performed by: NURSE PRACTITIONER

## 2023-06-30 PROCEDURE — 1159F MED LIST DOCD IN RCRD: CPT | Mod: CPTII,S$GLB,, | Performed by: NURSE PRACTITIONER

## 2023-06-30 PROCEDURE — 3078F DIAST BP <80 MM HG: CPT | Mod: CPTII,S$GLB,, | Performed by: NURSE PRACTITIONER

## 2023-06-30 PROCEDURE — 3288F PR FALLS RISK ASSESSMENT DOCUMENTED: ICD-10-PCS | Mod: CPTII,S$GLB,, | Performed by: NURSE PRACTITIONER

## 2023-06-30 PROCEDURE — 3288F FALL RISK ASSESSMENT DOCD: CPT | Mod: CPTII,S$GLB,, | Performed by: NURSE PRACTITIONER

## 2023-06-30 PROCEDURE — 4010F ACE/ARB THERAPY RXD/TAKEN: CPT | Mod: CPTII,S$GLB,, | Performed by: NURSE PRACTITIONER

## 2023-06-30 PROCEDURE — 1101F PR PT FALLS ASSESS DOC 0-1 FALLS W/OUT INJ PAST YR: ICD-10-PCS | Mod: CPTII,S$GLB,, | Performed by: NURSE PRACTITIONER

## 2023-06-30 PROCEDURE — 3078F PR MOST RECENT DIASTOLIC BLOOD PRESSURE < 80 MM HG: ICD-10-PCS | Mod: CPTII,S$GLB,, | Performed by: NURSE PRACTITIONER

## 2023-06-30 RX ORDER — CALCITRIOL 0.25 UG/1
0.25 CAPSULE ORAL DAILY
Qty: 30 CAPSULE | Refills: 11
Start: 2023-06-30

## 2023-06-30 RX ORDER — MULTIVIT WITH MINERALS/HERBS
1 TABLET ORAL DAILY
COMMUNITY

## 2023-06-30 RX ORDER — ABALOPARATIDE 2000 UG/ML
80 INJECTION, SOLUTION SUBCUTANEOUS DAILY
COMMUNITY

## 2023-10-27 ENCOUNTER — OFFICE VISIT (OUTPATIENT)
Dept: NEPHROLOGY | Facility: CLINIC | Age: 73
End: 2023-10-27
Payer: MEDICARE

## 2023-10-27 VITALS
HEART RATE: 91 BPM | OXYGEN SATURATION: 97 % | HEIGHT: 60 IN | SYSTOLIC BLOOD PRESSURE: 129 MMHG | DIASTOLIC BLOOD PRESSURE: 87 MMHG | BODY MASS INDEX: 45.55 KG/M2 | WEIGHT: 232 LBS

## 2023-10-27 DIAGNOSIS — N25.81 SECONDARY HYPERPARATHYROIDISM OF RENAL ORIGIN: ICD-10-CM

## 2023-10-27 DIAGNOSIS — E66.01 MORBID OBESITY: ICD-10-CM

## 2023-10-27 DIAGNOSIS — E11.22 HYPERTENSION ASSOCIATED WITH STAGE 4 CHRONIC KIDNEY DISEASE DUE TO TYPE 2 DIABETES MELLITUS: ICD-10-CM

## 2023-10-27 DIAGNOSIS — N18.4 TYPE 2 DIABETES MELLITUS WITH STAGE 4 CHRONIC KIDNEY DISEASE, WITHOUT LONG-TERM CURRENT USE OF INSULIN: ICD-10-CM

## 2023-10-27 DIAGNOSIS — N18.4 CHRONIC KIDNEY DISEASE, STAGE IV (SEVERE): ICD-10-CM

## 2023-10-27 DIAGNOSIS — N18.4 HYPERTENSION ASSOCIATED WITH STAGE 4 CHRONIC KIDNEY DISEASE DUE TO TYPE 2 DIABETES MELLITUS: ICD-10-CM

## 2023-10-27 DIAGNOSIS — R80.9 NON-NEPHROTIC RANGE PROTEINURIA: ICD-10-CM

## 2023-10-27 DIAGNOSIS — I12.9 HYPERTENSION ASSOCIATED WITH STAGE 4 CHRONIC KIDNEY DISEASE DUE TO TYPE 2 DIABETES MELLITUS: ICD-10-CM

## 2023-10-27 DIAGNOSIS — E11.22 TYPE 2 DIABETES MELLITUS WITH STAGE 4 CHRONIC KIDNEY DISEASE, WITHOUT LONG-TERM CURRENT USE OF INSULIN: ICD-10-CM

## 2023-10-27 DIAGNOSIS — E78.2 HYPERLIPIDEMIA, MIXED: ICD-10-CM

## 2023-10-27 PROCEDURE — 3079F DIAST BP 80-89 MM HG: CPT | Mod: CPTII,S$GLB,, | Performed by: NURSE PRACTITIONER

## 2023-10-27 PROCEDURE — 3044F PR MOST RECENT HEMOGLOBIN A1C LEVEL <7.0%: ICD-10-PCS | Mod: CPTII,S$GLB,, | Performed by: NURSE PRACTITIONER

## 2023-10-27 PROCEDURE — 99213 OFFICE O/P EST LOW 20 MIN: CPT | Mod: S$GLB,,, | Performed by: NURSE PRACTITIONER

## 2023-10-27 PROCEDURE — 4010F ACE/ARB THERAPY RXD/TAKEN: CPT | Mod: CPTII,S$GLB,, | Performed by: NURSE PRACTITIONER

## 2023-10-27 PROCEDURE — 1160F PR REVIEW ALL MEDS BY PRESCRIBER/CLIN PHARMACIST DOCUMENTED: ICD-10-PCS | Mod: CPTII,S$GLB,, | Performed by: NURSE PRACTITIONER

## 2023-10-27 PROCEDURE — 1160F RVW MEDS BY RX/DR IN RCRD: CPT | Mod: CPTII,S$GLB,, | Performed by: NURSE PRACTITIONER

## 2023-10-27 PROCEDURE — 4010F PR ACE/ARB THEARPY RXD/TAKEN: ICD-10-PCS | Mod: CPTII,S$GLB,, | Performed by: NURSE PRACTITIONER

## 2023-10-27 PROCEDURE — 3079F PR MOST RECENT DIASTOLIC BLOOD PRESSURE 80-89 MM HG: ICD-10-PCS | Mod: CPTII,S$GLB,, | Performed by: NURSE PRACTITIONER

## 2023-10-27 PROCEDURE — 3288F PR FALLS RISK ASSESSMENT DOCUMENTED: ICD-10-PCS | Mod: CPTII,S$GLB,, | Performed by: NURSE PRACTITIONER

## 2023-10-27 PROCEDURE — 3288F FALL RISK ASSESSMENT DOCD: CPT | Mod: CPTII,S$GLB,, | Performed by: NURSE PRACTITIONER

## 2023-10-27 PROCEDURE — 3074F SYST BP LT 130 MM HG: CPT | Mod: CPTII,S$GLB,, | Performed by: NURSE PRACTITIONER

## 2023-10-27 PROCEDURE — 3008F BODY MASS INDEX DOCD: CPT | Mod: CPTII,S$GLB,, | Performed by: NURSE PRACTITIONER

## 2023-10-27 PROCEDURE — 3066F NEPHROPATHY DOC TX: CPT | Mod: CPTII,S$GLB,, | Performed by: NURSE PRACTITIONER

## 2023-10-27 PROCEDURE — 1159F PR MEDICATION LIST DOCUMENTED IN MEDICAL RECORD: ICD-10-PCS | Mod: CPTII,S$GLB,, | Performed by: NURSE PRACTITIONER

## 2023-10-27 PROCEDURE — 3066F PR DOCUMENTATION OF TREATMENT FOR NEPHROPATHY: ICD-10-PCS | Mod: CPTII,S$GLB,, | Performed by: NURSE PRACTITIONER

## 2023-10-27 PROCEDURE — 3074F PR MOST RECENT SYSTOLIC BLOOD PRESSURE < 130 MM HG: ICD-10-PCS | Mod: CPTII,S$GLB,, | Performed by: NURSE PRACTITIONER

## 2023-10-27 PROCEDURE — 1159F MED LIST DOCD IN RCRD: CPT | Mod: CPTII,S$GLB,, | Performed by: NURSE PRACTITIONER

## 2023-10-27 PROCEDURE — 1101F PR PT FALLS ASSESS DOC 0-1 FALLS W/OUT INJ PAST YR: ICD-10-PCS | Mod: CPTII,S$GLB,, | Performed by: NURSE PRACTITIONER

## 2023-10-27 PROCEDURE — 3044F HG A1C LEVEL LT 7.0%: CPT | Mod: CPTII,S$GLB,, | Performed by: NURSE PRACTITIONER

## 2023-10-27 PROCEDURE — 99213 PR OFFICE/OUTPT VISIT, EST, LEVL III, 20-29 MIN: ICD-10-PCS | Mod: S$GLB,,, | Performed by: NURSE PRACTITIONER

## 2023-10-27 PROCEDURE — 3008F PR BODY MASS INDEX (BMI) DOCUMENTED: ICD-10-PCS | Mod: CPTII,S$GLB,, | Performed by: NURSE PRACTITIONER

## 2023-10-27 PROCEDURE — 1101F PT FALLS ASSESS-DOCD LE1/YR: CPT | Mod: CPTII,S$GLB,, | Performed by: NURSE PRACTITIONER

## 2023-10-27 RX ORDER — OMEPRAZOLE 20 MG/1
20 CAPSULE, DELAYED RELEASE ORAL EVERY MORNING
COMMUNITY
Start: 2023-08-19

## 2023-10-27 RX ORDER — TRAMADOL HYDROCHLORIDE 50 MG/1
50 TABLET ORAL 2 TIMES DAILY PRN
COMMUNITY
Start: 2023-08-25

## 2023-10-27 NOTE — PROGRESS NOTES
Pt Name:  Estefania Bravo  Pt :  1950  Pt MRN:  [id number]    Date: 10/27/2023    Reason for visit:   Estefania Bravo is a 73 y.o. aa female presenting for CKD follow up with serum creatinine 2.92, eGFR 16 and Chronic Kidney Disease Stage IV.     Chief Complaint:   The patient denies any complaints today.    HPI:  The patient is being seen today for follow up CKD and the status of her kidney function. Since the last visit, patient reports no health changes.  She is scheduled for a right knee replacement on 23.  The patient denies fatigue, weakness, poor appetite, metallic taste, nausea, cramping, chest pain, palpitations, SOB, orthopnea, PND, edema, trouble concentrating, decreased urination.      Home BPs when checked are <130/80 per patient. The patient is following a low sodium diet. The patient is avoiding NSAIDs. The patient is drinking 32 oz of water daily.     Since last visit on 23 patient reports no changes in medical history.      History:   Past Medical History:   Diagnosis Date    Disorder of kidney and ureter     Diverticulosis     DM (diabetes mellitus)     GERD (gastroesophageal reflux disease)     Graves disease     Hiatal hernia     HTN (hypertension)     Hypothyroidism, postablative     Osteoporosis     Persistent proteinuria 2022    Personal history of colonic polyps     Schatzki's ring     Stroke     Vitamin D deficiency 2022     Past Surgical History:   Procedure Laterality Date    CARDIAC CATHETERIZATION      CHOLECYSTECTOMY      COLONOSCOPY      THYROID ABLATION      TUBAL LIGATION      UPPER GASTROINTESTINAL ENDOSCOPY       Family History   Problem Relation Age of Onset    Heart disease Mother     Heart disease Father     Cancer Sister     Kidney failure Sister      Social History     Substance and Sexual Activity   Alcohol Use Not Currently     Social History     Substance and Sexual Activity   Drug Use Not Currently     Social History     Substance and  Sexual Activity   Sexual Activity Not Currently     reports that she is not currently sexually active.  Social History     Tobacco Use   Smoking Status Former   Smokeless Tobacco Never       Allergies:  Review of patient's allergies indicates:   Allergen Reactions    Denosumab Swelling         Current Outpatient Medications:     abaloparatide (TYMLOS) 80 mcg (3,120 mcg/1.56 mL) PnIj, Inject 80 mcg into the skin once daily., Disp: , Rfl:     aspirin 325 MG tablet, Take 325 mg by mouth once daily., Disp: , Rfl:     atorvastatin (LIPITOR) 80 MG tablet, Take 80 mg by mouth every evening., Disp: , Rfl:     b complex vitamins tablet, Take 1 tablet by mouth once daily., Disp: , Rfl:     calcitRIOL (ROCALTROL) 0.25 MCG Cap, Take 1 capsule (0.25 mcg total) by mouth once daily., Disp: 30 capsule, Rfl: 11    cholecalciferol, vitamin D3, (VITAMIN D3) 50 mcg (2,000 unit) Cap capsule, Take 1,000 Units by mouth once daily., Disp: , Rfl:     dapagliflozin (FARXIGA) 10 mg tablet, Take 10 mg by mouth once daily., Disp: , Rfl:     famotidine (PEPCID) 40 MG tablet, Take 40 mg by mouth Daily., Disp: , Rfl:     FLUoxetine 10 MG capsule, Take 1 capsule by mouth once daily., Disp: , Rfl:     gabapentin (NEURONTIN) 100 MG capsule, Take 100 mg by mouth nightly., Disp: , Rfl:     latanoprost 0.005 % ophthalmic solution, Place 1 drop into both eyes as needed., Disp: , Rfl:     levothyroxine (SYNTHROID) 100 MCG tablet, Take 100 mcg by mouth once daily at 6am. Every morning, Disp: , Rfl:     lisinopriL-hydrochlorothiazide (PRINZIDE,ZESTORETIC) 20-12.5 mg per tablet, Take 1 tablet by mouth once daily., Disp: , Rfl:     methocarbamoL (ROBAXIN) 500 MG Tab, Take 500 mg by mouth as needed., Disp: , Rfl:     metoprolol tartrate (LOPRESSOR) 25 MG tablet, Take 25 mg by mouth 2 (two) times daily., Disp: , Rfl:     omeprazole (PRILOSEC) 20 MG capsule, Take 20 mg by mouth every morning., Disp: , Rfl:     pioglitazone (ACTOS) 30 MG tablet, Take 30 mg by  mouth once daily. am, Disp: , Rfl:     traMADoL (ULTRAM) 50 mg tablet, Take 50 mg by mouth 2 (two) times daily as needed., Disp: , Rfl:     ROS:  Review of Systems   Constitutional:  Negative for activity change and appetite change.   HENT:  Negative for hearing loss.    Eyes:  Negative for visual disturbance.   Respiratory:  Negative for shortness of breath and wheezing.    Cardiovascular:  Negative for chest pain.   Gastrointestinal:  Negative for abdominal pain, diarrhea, nausea and vomiting.   Genitourinary:  Negative for decreased urine volume, dysuria, flank pain, frequency and urgency.   Neurological:  Negative for dizziness, weakness and headaches.       Physical Exam:   Vitals:   Vitals:    10/27/23 1007   BP: 129/87   Pulse: 91   SpO2: 97%   Weight: 105.2 kg (232 lb)   Height: 5' (1.524 m)     Body mass index is 45.31 kg/m².    Physical Exam  Vitals reviewed.   Constitutional:       General: She is not in acute distress.     Appearance: Normal appearance. She is morbidly obese.   HENT:      Head: Normocephalic and atraumatic.      Mouth/Throat:      Mouth: Mucous membranes are moist.   Eyes:      Extraocular Movements: Extraocular movements intact.      Pupils: Pupils are equal, round, and reactive to light.   Cardiovascular:      Rate and Rhythm: Normal rate and regular rhythm.      Heart sounds: No murmur heard.  Pulmonary:      Effort: Pulmonary effort is normal.      Breath sounds: No wheezing, rhonchi or rales.   Musculoskeletal:         General: No swelling.      Right lower leg: Edema present.      Left lower leg: Edema present.   Skin:     General: Skin is warm and dry.   Neurological:      Mental Status: She is alert and oriented to person, place, and time.   Psychiatric:         Mood and Affect: Mood normal.         Behavior: Behavior normal.         Labs/Tests 10/25/23:    RENAL FUNCTION PANEL     Ref Range & Units 2 d ago   Sodium 136 - 147 mmol/L 142    Potassium 3.5 - 5.1 mmol/L 4.2     Chloride 98 - 107 mmol/L 107    CO2 20 - 31 mmol/L 24    Glucose 70 - 99 mg/dL 108 High     BUN 9 - 23 mg/dL 42 High     Creatinine 0.55 - 1.02 mg/dL 2.92 High     Calcium 8.3 - 10.6 mg/dL 9.3    Phosphorus Level 2.4 - 5.1 mg/dL 4.6    Albumin Level 3.2 - 4.8 g/dL 4.1    Anion Gap 5.0 - 15.0 mmol/L 10.5    eGFR CKD-EPI >90 ml/min/1.73m2 16 Low      HEMOGLOBIN     Ref Range & Units 2 d ago   Hemoglobin 11.3 - 15.4 g/dL 13.4        Lab Results   Component Value Date     (H) 10/25/2023    HGB 12.8 06/28/2023    HGB 13.1 03/23/2023    HGB 13.9 12/07/2022    HGBA1C 6.0 (H) 09/05/2023    TRIG 131 09/05/2023    CHOL 223 (H) 09/05/2023    HDL 53 09/05/2023    LDLCALC 144 (H) 09/05/2023    LABVLDL 26 09/05/2023    LARQMHKO32QY 59.3 10/25/2023         Lab Results   Component Value Date    UPROTCREA 0.7 (H) 10/25/2023    UPROTCREA 0.7 (H) 06/28/2023    UPROTCREA 0.4 (H) 03/23/2023         Diagnosis:  Plan and Assessment:  1. Type 2 diabetes mellitus with stage 4 chronic kidney disease, without long-term current use of insulin  Assessment & Plan:  Well Controlled with HgbA1c 6.0% on 5/3/23 - Farxiga 10 mg po daily     Orders:  -     Renal Function Panel; Future; Expected date: 01/27/2024  -     Protein/Creatinine Ratio, Urine; Future; Expected date: 01/27/2024    2. Hypertension associated with stage 4 chronic kidney disease due to type 2 diabetes mellitus  Assessment & Plan:  At goal - 2 Gram NA diet, Monitor BP; Continue Lisinopril / HCTZ 20/12.5 mg po daily; Metoprolol 25 mg po BID - She does not take her second daily dose of Metoprolol if her SBP is < 110     Orders:  -     Renal Function Panel; Future; Expected date: 01/27/2024    3. Chronic kidney disease, stage IV (severe)  Assessment & Plan:  Serum creatinine 2.92 / eGFR 16; (2.94 / eGFR 16 on 6/28/23 and 2.97 / eGFR 16 on 3/23/23 and 2.89 / eGFR 17 on 12/7/22) - without anemia - Avoid NSAIDS, Assure 64 oz of water daily, Meds per Med list above.      She  underwent Left upper arm AV graft removal and pericardial patching of the brachial artery due to steal syndrome in the OR on 10/20/22 by Dr. Patrick.  Non-functioning.     She was referred to CKD Smart [facility] and attended via telephone.       Orders:  -     Hemoglobin; Future; Expected date: 01/27/2024  -     Vitamin D; Future; Expected date: 01/27/2024  -     Renal Function Panel; Future; Expected date: 01/27/2024  -     PTH, Intact; Future; Expected date: 01/27/2024  -     Protein/Creatinine Ratio, Urine; Future; Expected date: 01/27/2024    4. Secondary hyperparathyroidism of renal origin  Assessment & Plan:   - up from 289 - up from 167 - Continue Rocaltrol 25 mcg po daily     Orders:  -     Vitamin D; Future; Expected date: 01/27/2024  -     PTH, Intact; Future; Expected date: 01/27/2024    5. Non-nephrotic range proteinuria  Assessment & Plan:  700 mg - stable from 700 mg on 6/28/23 - up from 400 mg -   ACE / SGLT2     Orders:  -     Protein/Creatinine Ratio, Urine; Future; Expected date: 01/27/2024    6. Hyperlipidemia, mixed  Assessment & Plan:  Continue Lipitor 80 mg po nightly       7. Morbid obesity  Assessment & Plan:  BMI 45.3 - she walks for exercise -              My reconciliation of medication should not condone or support use of medications that have been previously prescribed for patients by other providers.  I am only documenting the current medications patients is taking and may change doses, add or discontinue medications based on information provided by the patient.     The patient has been provided with their current level of kidney function including eGFR and creatinine.    We discussed the potential for common complications of CKD including anemia, electrolyte abnormalities, abnormal fluid balance, mineral bone disease and malnutrition.    We discussed strategies to slow the progression of their kidney disease including:  Avoid nephrotoxic agents. Avoid over-the-counter and prescription  NSAIDs (Ibuprofren, Motrin, Naproxyn, Aleve, Mobic, Celebrex, Toradol, Advil). All of these can worsen kidney function, elevate BP, cause fluid retention/swelling and elevate potassium. Avoid iodine contrast agents and gadolinium, which can worsen kidney function and/or cause kidney failure. Avoid phosphosoda bowel preps which can worsen kidney function.  Work to improve modifiable risk factors. Aim for good control of blood glucose without episodes of hypoglycemia. Notify the provider managing your diabetes if your blood glucose < 60. Aim for good blood pressure control without episodes of hypotension. Call the office if your systolic blood pressure is consistently < 110. Aim for good control of your cholesterol.  AIC goal <7.0  BP goal <130/80        Keeping these in goal range will help prevent progression of cardiovascular disease            and chronic kidney disease.    We discussed dietary modifications:  Low sodium diet: 2 gm/d or less  Limit/avoid high potassium foods  Avoid potassium containing salt substitutes  Limit/avoid high phosphorus foods  Limit daily protein intake to 0.8-1 gm/kg of your ideal body weight.    We discussed lifestyle modifications:  Make sure you are drinking plenty of fluids--64 ounces (1/2 gallon) daily  Exercise at least 30 minutes 5 x per week (total 150 minutes per week), example brisk walking  Achieve and maintain a healthy weight (BMI 20-25)  Limit alcohol consumption to <2 drinks per day  Stop smoking  Make sure you stay current on important vaccines-- pneumonia vaccines (Pneumovax and Prevnar), flu vaccine, Hepatitis B (especially patients nearing renal replacement therapy and planning hemodialysis) and Covid-19 vaccine.     Recommendations:  Monitor your BP at home daily and record.  Bring readings to your next appt.  Call the office if your BP is persistently >130/80.  Seek urgent medical attention with signs and symptoms of uremia - extreme weakness, fatigue, confusion,  anorexia, metallic taste in mouth, hiccoughs, cramping, itching, chest pain, swelling, or trouble sleeping.    Follow Up:   Follow up in about 3 months (around 1/27/2024).

## 2023-10-27 NOTE — ASSESSMENT & PLAN NOTE
Serum creatinine 2.92 / eGFR 16; (2.94 / eGFR 16 on 6/28/23 and 2.97 / eGFR 16 on 3/23/23 and 2.89 / eGFR 17 on 12/7/22) - without anemia - Avoid NSAIDS, Assure 64 oz of water daily, Meds per Med list above.      She underwent Left upper arm AV graft removal and pericardial patching of the brachial artery due to steal syndrome in the OR on 10/20/22 by Dr. Patrick.  Non-functioning.     She was referred to CKD Smart Class and attended via telephone.